# Patient Record
Sex: MALE | Race: BLACK OR AFRICAN AMERICAN | NOT HISPANIC OR LATINO | URBAN - METROPOLITAN AREA
[De-identification: names, ages, dates, MRNs, and addresses within clinical notes are randomized per-mention and may not be internally consistent; named-entity substitution may affect disease eponyms.]

---

## 2019-11-15 ENCOUNTER — EMERGENCY (EMERGENCY)
Facility: HOSPITAL | Age: 44
LOS: 1 days | Discharge: HOME | End: 2019-11-15
Attending: EMERGENCY MEDICINE | Admitting: EMERGENCY MEDICINE
Payer: COMMERCIAL

## 2019-11-15 VITALS
OXYGEN SATURATION: 97 % | TEMPERATURE: 98 F | DIASTOLIC BLOOD PRESSURE: 84 MMHG | HEART RATE: 95 BPM | SYSTOLIC BLOOD PRESSURE: 120 MMHG | RESPIRATION RATE: 16 BRPM

## 2019-11-15 PROCEDURE — 99282 EMERGENCY DEPT VISIT SF MDM: CPT

## 2019-11-15 NOTE — ED PROVIDER NOTE - PATIENT PORTAL LINK FT
You can access the FollowMyHealth Patient Portal offered by [facility] by registering at the following website: [email or web address]. By joining [employer]’s FollowMyHealth portal, you will also be able to view your health information using other applications (apps) compatible with our system.

## 2019-11-15 NOTE — ED PROVIDER NOTE - CLINICAL SUMMARY MEDICAL DECISION MAKING FREE TEXT BOX
43M taking penile injections past 2 weeks concerned about skin discoloration. No urinary symptoms, no rash or fevers. Vitals reassuring. Exam shows no signs of infection. Reassurance with Urologist follow up.

## 2019-11-15 NOTE — ED PROVIDER NOTE - ATTENDING CONTRIBUTION TO CARE
[place]: 43 yom with hx of ED complaining darkening skin at distal penis after he started using injections at base of the penis twice a week. No other sxs. Has appt with urology this week. Exam chaperoned by Dr. Campbell, circumcised no tenderness or masses or fluctuance. there is areas of more melanin but no ecchymosis. Outpt follow up.
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
## 2019-11-15 NOTE — ED PROVIDER NOTE - NSFOLLOWUPINSTRUCTIONS_ED_ALL_ED_FT
Your diagnosis: Penile change in color     Discharge instructions:    - Please follow up with your Urologist on Monday.     - Be sure to return to the ED if you develop new or worsening symptoms. Specific signs and symptoms to be vigilant of: fever or chills, chest pain, difficulty breathing, palpitations, loss of consciousness, headache, vision changes, slurred speech, difficulty swallowing or drooling, facial droop, weakness in the arms or legs, numbness or tingling, abdominal pain, nausea or vomiting, diarrhea, constipation, blood in the stool or urine, pain on urination, difficulty urinating or any other distressing symptoms.

## 2019-11-15 NOTE — ED PROVIDER NOTE - PHYSICAL EXAMINATION
GENERAL: no acute distress, non-toxic appearing  HEENT: normal conjunctiva, oral mucosa moist  CARDIAC: regular rate and rhythm, normal S1 and S2,  no appreciable murmurs  PULM: clear to ascultation bilaterally, no crackles, rales, rhonchi, or wheezing  GI: abdomen nondistended, soft, nontender, no guarding or rebound tenderness  : no CVA tenderness, no suprapubic tenderness  NEURO: alert and oriented x 3, normal speech, no focal motor or sensory deficits, nonantalgic gait  MSK: no visible deformities, no peripheral edema, calf tenderness/redness/swelling  SKIN: no visible rashes, non-erythematous genitalia, non-tender, no fluctuance to any area around genitalia, no crepitus, difficult to appreciate the discoloration patient is concerned about  PSYCH: appropriate mood and affect

## 2019-11-15 NOTE — ED PROVIDER NOTE - NS ED ROS FT
GENERAL: no fever, chills  HEENT: no cough, congestion, odynophagia, dysphagia  CARDIAC: no chest pain, palpitations, lightheadedness  PULM: no dyspnea, wheezing   GI: no abdominal pain, nausea, vomiting, diarrhea, constipation, melena, hematochezia  : no urinary dysuria, frequency, incontinence, hematuria  NEURO: no headache, motor weakness, sensory changes  MSK: no joint or muscle pain  SKIN: +skin discoloration of penis  HEME: no active bleeding, bruising

## 2019-11-15 NOTE — ED ADULT TRIAGE NOTE - CHIEF COMPLAINT QUOTE
Pt states that he has been taking penile injections x 2 weeks. Pt now noticing "disfigurement". Denies any signs of infection.

## 2020-02-09 ENCOUNTER — EMERGENCY (EMERGENCY)
Facility: HOSPITAL | Age: 45
LOS: 1 days | Discharge: HOME | End: 2020-02-09
Attending: STUDENT IN AN ORGANIZED HEALTH CARE EDUCATION/TRAINING PROGRAM | Admitting: STUDENT IN AN ORGANIZED HEALTH CARE EDUCATION/TRAINING PROGRAM
Payer: OTHER MISCELLANEOUS

## 2020-02-09 VITALS
SYSTOLIC BLOOD PRESSURE: 124 MMHG | HEART RATE: 105 BPM | TEMPERATURE: 98 F | RESPIRATION RATE: 18 BRPM | DIASTOLIC BLOOD PRESSURE: 73 MMHG | OXYGEN SATURATION: 97 %

## 2020-02-09 VITALS
RESPIRATION RATE: 15 BRPM | SYSTOLIC BLOOD PRESSURE: 105 MMHG | HEART RATE: 81 BPM | DIASTOLIC BLOOD PRESSURE: 65 MMHG | OXYGEN SATURATION: 96 % | TEMPERATURE: 98 F

## 2020-02-09 PROCEDURE — 73080 X-RAY EXAM OF ELBOW: CPT | Mod: 26,RT

## 2020-02-09 PROCEDURE — 99283 EMERGENCY DEPT VISIT LOW MDM: CPT

## 2020-02-09 PROCEDURE — 99053 MED SERV 10PM-8AM 24 HR FAC: CPT

## 2020-02-09 PROCEDURE — 73030 X-RAY EXAM OF SHOULDER: CPT | Mod: 26,LT

## 2020-02-09 PROCEDURE — 71045 X-RAY EXAM CHEST 1 VIEW: CPT | Mod: 26

## 2020-02-09 RX ORDER — LIDOCAINE 4 G/100G
1 CREAM TOPICAL ONCE
Refills: 0 | Status: COMPLETED | OUTPATIENT
Start: 2020-02-09 | End: 2020-02-09

## 2020-02-09 RX ORDER — IBUPROFEN 200 MG
600 TABLET ORAL ONCE
Refills: 0 | Status: COMPLETED | OUTPATIENT
Start: 2020-02-09 | End: 2020-02-09

## 2020-02-09 RX ORDER — OXYCODONE HYDROCHLORIDE 5 MG/1
5 TABLET ORAL ONCE
Refills: 0 | Status: DISCONTINUED | OUTPATIENT
Start: 2020-02-09 | End: 2020-02-09

## 2020-02-09 RX ORDER — DIAZEPAM 5 MG
5 TABLET ORAL ONCE
Refills: 0 | Status: DISCONTINUED | OUTPATIENT
Start: 2020-02-09 | End: 2020-02-09

## 2020-02-09 RX ORDER — ACETAMINOPHEN 500 MG
975 TABLET ORAL ONCE
Refills: 0 | Status: COMPLETED | OUTPATIENT
Start: 2020-02-09 | End: 2020-02-09

## 2020-02-09 RX ADMIN — Medication 600 MILLIGRAM(S): at 07:44

## 2020-02-09 RX ADMIN — Medication 5 MILLIGRAM(S): at 06:34

## 2020-02-09 RX ADMIN — OXYCODONE HYDROCHLORIDE 5 MILLIGRAM(S): 5 TABLET ORAL at 06:34

## 2020-02-09 RX ADMIN — OXYCODONE HYDROCHLORIDE 5 MILLIGRAM(S): 5 TABLET ORAL at 07:44

## 2020-02-09 RX ADMIN — Medication 600 MILLIGRAM(S): at 06:35

## 2020-02-09 RX ADMIN — LIDOCAINE 1 PATCH: 4 CREAM TOPICAL at 08:23

## 2020-02-09 RX ADMIN — Medication 975 MILLIGRAM(S): at 08:23

## 2020-02-09 NOTE — ED PROVIDER NOTE - PATIENT PORTAL LINK FT
You can access the FollowMyHealth Patient Portal offered by [facility] by registering at the following website: [email or web address]. By joining [employer]’s FollowMyHealth portal, you will also be able to view your health information using other applications (apps) compatible with our system.

## 2020-02-09 NOTE — ED ADULT NURSE NOTE - OBJECTIVE STATEMENT
Patient is a 44y male, A&Ox4, ambulatory, complaining of falling and injuring his     Bed in lowest locked position. Family member at bedside. Will continue to monitor. Patient is a 44y male, A&Ox4, ambulatory, complaining of falling and injuring his left shoulder and right elbow.   Medication administered as per order. Bed in lowest locked position. Family member at bedside. Will continue to monitor.

## 2020-02-09 NOTE — ED PROVIDER NOTE - PHYSICAL EXAMINATION
GEN: no acute respiratory distress. nontoxic, speaking comfortably in full sentences  HEENT: NCAT. face symmetrical. PERRL EOMI, MMM, oropharynx wnl.  Neck: no JVD, trachea midline, no LAD, no tenderness  CV: RRR. +S1S2, no murmur. 2+ pulses in 4 extremities  Chest: CTA B/l. no wheezing, rales, rhonchi. no retractions.   ABD: +BS, soft, non distended, non tender.   MSK: No clubbing, cyanosis, edema. Right arm: FROM, tenderness over right olecranon, no swelling, sensation intact, R Rad/Ulnar arteries 2+. Left ext: tenderness over the left shoulder, decreased rom of shoulder, other joints with FROM, no gross deformity, sensation intact, 2+  left rad/ulnar arteries. No midline or paraspinal tenderness. no spinal step-offs.  Neuro: AAOX3.  Sensation intact, motor 5/5 throughout.   SKIN: No erythema, lesions or rash

## 2020-02-09 NOTE — ED POST DISCHARGE NOTE - RESULT SUMMARY
Received call from Radiology Elbow Xray : read last night as no FX addendum FX of elbow. Patient contact # [phone] Mailbox full alt # [phone] Msg left with Call back PA # and hrs and CDU PA #.

## 2020-02-09 NOTE — ED ADULT TRIAGE NOTE - CHIEF COMPLAINT QUOTE
s/p fall yesterday while at work. pt says he slipped and fell in the side walk and injured his left shoulder and right elbow. c/o severe pain and swelling..  Pt says he is unable to left arm. positive radial pulse and capillary refill is good. No LOC.  Denies any PMH.

## 2020-02-09 NOTE — ED PROVIDER NOTE - NS ED ROS FT
Constitutional: No fever. no chills.   Eyes: No visual changes, eye pain or redness  HEENT: No throat pain, hearing changes, ear pain  CV: No chest pain, no palpitations  Resp: No shortness of breath, no cough  GI: No abdominal pain. No nausea. no  vomiting.   MSK: + musculoskeletal pain  Skin: No rash, lesions, no bruises  Neuro: No headache. No numbness or tingling. No weakness. No dizziness.  Allergy/Immunology: no allergy to medicine or food.

## 2020-02-09 NOTE — ED PROVIDER NOTE - OBJECTIVE STATEMENT
45 yo M no past medical history presents for 1 day of right elbow and  left shoulder pain. 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 patient denies head trauma, loc, neck pain. denies pain elsewhere. no numbness weakness, snuff box pain. states was inially fine (friday) but pain develpoed more yesterday.

## 2020-02-09 NOTE — ED PROVIDER NOTE - NSFOLLOWUPINSTRUCTIONS_ED_ALL_ED_FT
Please follow up with your primary care doctor.  Schedule follow up with orthopedic doctor if symptoms persist with information provided  Can take tylenol 500mg every 6 hours for pain. Can also take ibuprofen 600mg every6 hours for pain.   Return to the Emergency Department for any new or worsening symptoms

## 2020-02-09 NOTE — ED PROVIDER NOTE - CLINICAL SUMMARY MEDICAL DECISION MAKING FREE TEXT BOX
right elbow and left shoulder pain after fall from standing,. neurovascularly intact. will assess for fracture/dislocation. plan: symptom relief, xr, reassess. xray negative. symptoms improve but still some patient. recommended ortho f/u with cont pain med prn. stable for dc.

## 2021-06-08 ENCOUNTER — EMERGENCY (EMERGENCY)
Facility: HOSPITAL | Age: 46
LOS: 1 days | Discharge: HOME | End: 2021-06-08
Admitting: EMERGENCY MEDICINE
Payer: OTHER MISCELLANEOUS

## 2021-06-08 VITALS
HEART RATE: 82 BPM | DIASTOLIC BLOOD PRESSURE: 93 MMHG | TEMPERATURE: 98 F | SYSTOLIC BLOOD PRESSURE: 136 MMHG | OXYGEN SATURATION: 99 % | RESPIRATION RATE: 16 BRPM

## 2021-06-08 VITALS
TEMPERATURE: 99 F | OXYGEN SATURATION: 96 % | DIASTOLIC BLOOD PRESSURE: 96 MMHG | HEART RATE: 76 BPM | RESPIRATION RATE: 16 BRPM | SYSTOLIC BLOOD PRESSURE: 119 MMHG

## 2021-06-08 PROCEDURE — 99284 EMERGENCY DEPT VISIT MOD MDM: CPT

## 2021-06-08 PROCEDURE — 73562 X-RAY EXAM OF KNEE 3: CPT | Mod: 26,RT

## 2021-06-08 RX ORDER — IBUPROFEN 200 MG
400 TABLET ORAL ONCE
Refills: 0 | Status: COMPLETED | OUTPATIENT
Start: 2021-06-08 | End: 2021-06-08

## 2021-06-08 RX ADMIN — Medication 400 MILLIGRAM(S): at 12:40

## 2021-06-08 NOTE — ED PROVIDER NOTE - NEUROLOGICAL, MLM
Alert and oriented, no focal deficits, no motor or sensory deficits. Alert and oriented, no focal deficits, no motor or sensory deficits. 5/5 strength. No saddle anesthesia.

## 2021-06-08 NOTE — ED PROVIDER NOTE - PATIENT PORTAL LINK FT
You can access the FollowMyHealth Patient Portal offered by [facility] by registering at the following website: [email or web address]. By joining [employer]’s FollowMyHealth portal, you will also be able to view your health information using other applications (apps) compatible with our system.

## 2021-06-08 NOTE — ED PROVIDER NOTE - MUSCULOSKELETAL MINIMAL EXAM
+L cervical paraspinal tenderness. +B/L lumbar paraspinal tenderness. NO midline tenderness. FROM. Atraumatic. +L cervical paraspinal tenderness. +B/L lumbar paraspinal tenderness. NO midline tenderness./atraumatic/normal range of motion
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
## 2023-02-05 NOTE — ED PROVIDER NOTE - INTERNATIONAL TRAVEL
[facility] AMBULATORY ENCOUNTER    URGENT CARE OFFICE VISIT    CHIEF COMPLAINT:    Chief Complaint   Patient presents with   • Ear Problem       SUBJECTIVE:  Leon Llanes is a 23 year old male, who presents with muffled right hearing for the last week and half to 2 weeks.  He tried cleaning his ears but he does not think there is any wax.  Denies being congested.  He does work as a  and says the air inside the cab of his truck his very dry in the winter.      REVIEW OF SYSTEMS:  A review of systems was performed and findings relevant to this complaint are included in the History of Present Illness.    HISTORIES:  ALLERGIES:   Allergen Reactions   • Horse   (Animal) SWELLING     watery eyes etc.--seasonal allergies   • Pollen SWELLING     grasses, trees  (seasonal allergies)         OBJECTIVE:  Visit Vitals  /81   Pulse 75   Temp 98.1 °F (36.7 °C) (Temporal)   Resp 16   Wt 99.8 kg (220 lb)   SpO2 96%   BMI 33.95 kg/m²       CONSTITUTIONAL:  Well appearing. No distress.  Eyes:  Conjunctivae clear.   Ears:  Bilateral TMs have good translucency and reflection.  The right ear does have some bulging from serous appearing fluid.  There is no significant cerumen accumulation in either canal.  The canals are patent without erythema or swelling.    Oral:  Moist mucous membranes.  Oral cavity and pharynx normal in appearance.  Tonsils not appreciably enlarged.  LUNGS:  Easy work of breathing on room air.  No wheezing, crackles or rhonchi noted on auscultation.  CARDIOVASCULAR:  Regular rhythm.  No murmurs.   SKIN:  Warm and dry.         ASSESSMENT/ PLAN:  1. Dysfunction of right eustachian tube      -- Muffled hearing on the right without pain, appears to have clear effusion, likely from eustachian tube dysfunction.  Will start him on Zyrtec and Flonase daily for the next couple weeks.  If no improvement, follow with primary.     No

## 2024-04-15 ENCOUNTER — RESULT REVIEW (OUTPATIENT)
Age: 49
End: 2024-04-15

## 2024-04-15 ENCOUNTER — INPATIENT (INPATIENT)
Facility: HOSPITAL | Age: 49
LOS: 1 days | Discharge: HOME | End: 2024-04-17
Attending: STUDENT IN AN ORGANIZED HEALTH CARE EDUCATION/TRAINING PROGRAM | Admitting: STUDENT IN AN ORGANIZED HEALTH CARE EDUCATION/TRAINING PROGRAM
Payer: COMMERCIAL

## 2024-04-15 VITALS
HEART RATE: 96 BPM | TEMPERATURE: 99 F | RESPIRATION RATE: 18 BRPM | SYSTOLIC BLOOD PRESSURE: 115 MMHG | OXYGEN SATURATION: 96 % | DIASTOLIC BLOOD PRESSURE: 77 MMHG

## 2024-04-15 PROBLEM — Z78.9 OTHER SPECIFIED HEALTH STATUS: Chronic | Status: ACTIVE | Noted: 2021-06-08

## 2024-04-15 LAB
ADD ON TEST-SPECIMEN IN LAB: SIGNIFICANT CHANGE UP
ADD ON TEST-SPECIMEN IN LAB: SIGNIFICANT CHANGE UP
ALBUMIN SERPL ELPH-MCNC: 4.1 G/DL — SIGNIFICANT CHANGE UP (ref 3.3–5)
ALP SERPL-CCNC: 47 U/L — SIGNIFICANT CHANGE UP (ref 40–120)
ALT FLD-CCNC: 42 U/L — HIGH (ref 4–41)
ANION GAP SERPL CALC-SCNC: 12 MMOL/L — SIGNIFICANT CHANGE UP (ref 7–14)
AST SERPL-CCNC: 46 U/L — HIGH (ref 4–40)
BASOPHILS # BLD AUTO: 0.03 K/UL — SIGNIFICANT CHANGE UP (ref 0–0.2)
BASOPHILS NFR BLD AUTO: 0.7 % — SIGNIFICANT CHANGE UP (ref 0–2)
BILIRUB SERPL-MCNC: 0.6 MG/DL — SIGNIFICANT CHANGE UP (ref 0.2–1.2)
BUN SERPL-MCNC: 9 MG/DL — SIGNIFICANT CHANGE UP (ref 7–23)
CALCIUM SERPL-MCNC: 9 MG/DL — SIGNIFICANT CHANGE UP (ref 8.4–10.5)
CHLORIDE SERPL-SCNC: 99 MMOL/L — SIGNIFICANT CHANGE UP (ref 98–107)
CO2 SERPL-SCNC: 26 MMOL/L — SIGNIFICANT CHANGE UP (ref 22–31)
CREAT SERPL-MCNC: 0.99 MG/DL — SIGNIFICANT CHANGE UP (ref 0.5–1.3)
EGFR: 95 ML/MIN/1.73M2 — SIGNIFICANT CHANGE UP
EOSINOPHIL # BLD AUTO: 0.07 K/UL — SIGNIFICANT CHANGE UP (ref 0–0.5)
EOSINOPHIL NFR BLD AUTO: 1.6 % — SIGNIFICANT CHANGE UP (ref 0–6)
FLUAV AG NPH QL: SIGNIFICANT CHANGE UP
FLUBV AG NPH QL: SIGNIFICANT CHANGE UP
GLUCOSE SERPL-MCNC: 88 MG/DL — SIGNIFICANT CHANGE UP (ref 70–99)
HCT VFR BLD CALC: 50.3 % — HIGH (ref 39–50)
HGB BLD-MCNC: 16.3 G/DL — SIGNIFICANT CHANGE UP (ref 13–17)
IANC: 2.17 K/UL — SIGNIFICANT CHANGE UP (ref 1.8–7.4)
IMM GRANULOCYTES NFR BLD AUTO: 0.2 % — SIGNIFICANT CHANGE UP (ref 0–0.9)
LYMPHOCYTES # BLD AUTO: 1.54 K/UL — SIGNIFICANT CHANGE UP (ref 1–3.3)
LYMPHOCYTES # BLD AUTO: 36 % — SIGNIFICANT CHANGE UP (ref 13–44)
MCHC RBC-ENTMCNC: 27.8 PG — SIGNIFICANT CHANGE UP (ref 27–34)
MCHC RBC-ENTMCNC: 32.4 GM/DL — SIGNIFICANT CHANGE UP (ref 32–36)
MCV RBC AUTO: 85.8 FL — SIGNIFICANT CHANGE UP (ref 80–100)
MONOCYTES # BLD AUTO: 0.46 K/UL — SIGNIFICANT CHANGE UP (ref 0–0.9)
MONOCYTES NFR BLD AUTO: 10.7 % — SIGNIFICANT CHANGE UP (ref 2–14)
NEUTROPHILS # BLD AUTO: 2.17 K/UL — SIGNIFICANT CHANGE UP (ref 1.8–7.4)
NEUTROPHILS NFR BLD AUTO: 50.8 % — SIGNIFICANT CHANGE UP (ref 43–77)
NRBC # BLD: 0 /100 WBCS — SIGNIFICANT CHANGE UP (ref 0–0)
NRBC # FLD: 0 K/UL — SIGNIFICANT CHANGE UP (ref 0–0)
NT-PROBNP SERPL-SCNC: 204 PG/ML — SIGNIFICANT CHANGE UP
PLATELET # BLD AUTO: 206 K/UL — SIGNIFICANT CHANGE UP (ref 150–400)
POTASSIUM SERPL-MCNC: 4.5 MMOL/L — SIGNIFICANT CHANGE UP (ref 3.5–5.3)
POTASSIUM SERPL-SCNC: 4.5 MMOL/L — SIGNIFICANT CHANGE UP (ref 3.5–5.3)
PROT SERPL-MCNC: 7.3 G/DL — SIGNIFICANT CHANGE UP (ref 6–8.3)
RBC # BLD: 5.86 M/UL — HIGH (ref 4.2–5.8)
RBC # FLD: 14.7 % — HIGH (ref 10.3–14.5)
RSV RNA NPH QL NAA+NON-PROBE: SIGNIFICANT CHANGE UP
SARS-COV-2 RNA SPEC QL NAA+PROBE: SIGNIFICANT CHANGE UP
SODIUM SERPL-SCNC: 137 MMOL/L — SIGNIFICANT CHANGE UP (ref 135–145)
TROPONIN T, HIGH SENSITIVITY RESULT: 16 NG/L — SIGNIFICANT CHANGE UP
TROPONIN T, HIGH SENSITIVITY RESULT: 17 NG/L — SIGNIFICANT CHANGE UP
WBC # BLD: 4.28 K/UL — SIGNIFICANT CHANGE UP (ref 3.8–10.5)
WBC # FLD AUTO: 4.28 K/UL — SIGNIFICANT CHANGE UP (ref 3.8–10.5)

## 2024-04-15 PROCEDURE — 71046 X-RAY EXAM CHEST 2 VIEWS: CPT | Mod: 26

## 2024-04-15 PROCEDURE — 99223 1ST HOSP IP/OBS HIGH 75: CPT

## 2024-04-15 NOTE — ED ADULT NURSE REASSESSMENT NOTE - NS ED NURSE REASSESS COMMENT FT1
report received from antonio Bonilla. LAKESHA&Ox4. NAD. ambulatory. PT states he quit smoking cigarettes one year ago and has had a dry cough that has been in creasing in frequency and severity. PT states after a coughing fit yesterday he felt like he couldn't "breath" and when EMS showed up he was feeling better. however he woke up from sleeping last night with the same symptoms of not being able to breath so he decided to come to the hospital. pt denies SOB, chest pain, dizziness, weakness, urinary symptoms, HA, n/v/d, fevers, chills, pain. respirations ar even and un labored. safety precautions maintained.
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
## 2024-04-15 NOTE — ED ADULT TRIAGE NOTE - CHIEF COMPLAINT QUOTE
c/o an episode fo SOB yesterday while coughing could not catch breath at that time., reports breathing improved by not back to baseline. Denies PHx, endorses recent  long distance car drive after which developed left calf pain.
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
## 2024-04-15 NOTE — CONSULT NOTE ADULT - NS ATTEND AMEND GEN_ALL_CORE FT
Plan for cath today  Needs outpatient f/u with HF as well as STACY evaluation  start GDMT after cath results.

## 2024-04-15 NOTE — ED ADULT NURSE NOTE - NSFALLUNIVINTERV_ED_ALL_ED
Bed/Stretcher in lowest position, wheels locked, appropriate side rails in place/Call bell, personal items and telephone in reach/Instruct patient to call for assistance before getting out of bed/chair/stretcher/Non-slip footwear applied when patient is off stretcher/[place] to call system/Physically safe environment - no spills, clutter or unnecessary equipment/Purposeful proactive rounding/Room/bathroom lighting operational, light cord in reach
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
## 2024-04-15 NOTE — ED CDU PROVIDER INITIAL DAY NOTE - ATTENDING APP SHARED VISIT CONTRIBUTION OF CARE
CDU MD LEVINE:  I performed a face to face bedside interview with patient regarding history of present illness, review of symptoms and past medical history. I completed an independent physical exam.  I have discussed patient's plan of care with PA.   I agree with note as stated above, having amended the EMR as needed to reflect my findings. I have discussed the assessment and plan of care.  This includes during the time I functioned as the attending physician for this patient.    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  CDU for cardiac w/u.

## 2024-04-15 NOTE — ED PROVIDER NOTE - CLINICAL SUMMARY MEDICAL DECISION MAKING FREE TEXT BOX
47 yo M no PMHx presents with SOB x 1 year exertional, ex smoker, occupational exposures - VSS, nonfocal exam - ddx includes but is not limited to for ACS, HF vs COPD (less likely, no wheezing currently), viral pathology/pna, pulmonary fibrosis/malignancy given exposures. Will obtain labs, CXR, offer CDU for echo/stress vs outpatient followup as well as pulmonary follow up.

## 2024-04-16 DIAGNOSIS — R06.02 SHORTNESS OF BREATH: ICD-10-CM

## 2024-04-16 DIAGNOSIS — I50.20 UNSPECIFIED SYSTOLIC (CONGESTIVE) HEART FAILURE: ICD-10-CM

## 2024-04-16 DIAGNOSIS — Z29.9 ENCOUNTER FOR PROPHYLACTIC MEASURES, UNSPECIFIED: ICD-10-CM

## 2024-04-16 DIAGNOSIS — R74.8 ABNORMAL LEVELS OF OTHER SERUM ENZYMES: ICD-10-CM

## 2024-04-16 LAB
ALBUMIN SERPL ELPH-MCNC: 4.1 G/DL — SIGNIFICANT CHANGE UP (ref 3.3–5)
ALP SERPL-CCNC: 46 U/L — SIGNIFICANT CHANGE UP (ref 40–120)
ALT FLD-CCNC: 40 U/L — SIGNIFICANT CHANGE UP (ref 4–41)
ANION GAP SERPL CALC-SCNC: 12 MMOL/L — SIGNIFICANT CHANGE UP (ref 7–14)
APTT BLD: 31.5 SEC — SIGNIFICANT CHANGE UP (ref 24.5–35.6)
AST SERPL-CCNC: 37 U/L — SIGNIFICANT CHANGE UP (ref 4–40)
BILIRUB SERPL-MCNC: 0.6 MG/DL — SIGNIFICANT CHANGE UP (ref 0.2–1.2)
BUN SERPL-MCNC: 12 MG/DL — SIGNIFICANT CHANGE UP (ref 7–23)
CALCIUM SERPL-MCNC: 9.1 MG/DL — SIGNIFICANT CHANGE UP (ref 8.4–10.5)
CHLORIDE SERPL-SCNC: 100 MMOL/L — SIGNIFICANT CHANGE UP (ref 98–107)
CHOLEST SERPL-MCNC: 151 MG/DL — SIGNIFICANT CHANGE UP
CK SERPL-CCNC: 824 U/L — HIGH (ref 30–200)
CO2 SERPL-SCNC: 25 MMOL/L — SIGNIFICANT CHANGE UP (ref 22–31)
CREAT SERPL-MCNC: 0.97 MG/DL — SIGNIFICANT CHANGE UP (ref 0.5–1.3)
EGFR: 97 ML/MIN/1.73M2 — SIGNIFICANT CHANGE UP
GGT SERPL-CCNC: 28 U/L — SIGNIFICANT CHANGE UP (ref 8–61)
GLUCOSE SERPL-MCNC: 89 MG/DL — SIGNIFICANT CHANGE UP (ref 70–99)
HDLC SERPL-MCNC: 34 MG/DL — LOW
INR BLD: 1.14 RATIO — SIGNIFICANT CHANGE UP (ref 0.85–1.18)
LACTATE BLDV-MCNC: 1.8 MMOL/L — SIGNIFICANT CHANGE UP (ref 0.5–2)
LIPID PNL WITH DIRECT LDL SERPL: 103 MG/DL — HIGH
MAGNESIUM SERPL-MCNC: 1.8 MG/DL — SIGNIFICANT CHANGE UP (ref 1.6–2.6)
NON HDL CHOLESTEROL: 117 MG/DL — SIGNIFICANT CHANGE UP
PHOSPHATE SERPL-MCNC: 3.3 MG/DL — SIGNIFICANT CHANGE UP (ref 2.5–4.5)
POTASSIUM SERPL-MCNC: 4.5 MMOL/L — SIGNIFICANT CHANGE UP (ref 3.5–5.3)
POTASSIUM SERPL-SCNC: 4.5 MMOL/L — SIGNIFICANT CHANGE UP (ref 3.5–5.3)
PROT SERPL-MCNC: 7.5 G/DL — SIGNIFICANT CHANGE UP (ref 6–8.3)
PROTHROM AB SERPL-ACNC: 12.8 SEC — SIGNIFICANT CHANGE UP (ref 9.5–13)
SODIUM SERPL-SCNC: 137 MMOL/L — SIGNIFICANT CHANGE UP (ref 135–145)
TRIGL SERPL-MCNC: 70 MG/DL — SIGNIFICANT CHANGE UP
TSH SERPL-MCNC: 2.08 UIU/ML — SIGNIFICANT CHANGE UP (ref 0.27–4.2)

## 2024-04-16 PROCEDURE — 99223 1ST HOSP IP/OBS HIGH 75: CPT

## 2024-04-16 PROCEDURE — 76705 ECHO EXAM OF ABDOMEN: CPT | Mod: 26

## 2024-04-16 PROCEDURE — 99223 1ST HOSP IP/OBS HIGH 75: CPT | Mod: GC

## 2024-04-16 PROCEDURE — 93458 L HRT ARTERY/VENTRICLE ANGIO: CPT | Mod: 26

## 2024-04-16 PROCEDURE — 99233 SBSQ HOSP IP/OBS HIGH 50: CPT

## 2024-04-16 RX ORDER — INFLUENZA VIRUS VACCINE 15; 15; 15; 15 UG/.5ML; UG/.5ML; UG/.5ML; UG/.5ML
0.5 SUSPENSION INTRAMUSCULAR ONCE
Refills: 0 | Status: COMPLETED | OUTPATIENT
Start: 2024-04-16 | End: 2024-04-16

## 2024-04-16 RX ORDER — LANOLIN ALCOHOL/MO/W.PET/CERES
3 CREAM (GRAM) TOPICAL AT BEDTIME
Refills: 0 | Status: DISCONTINUED | OUTPATIENT
Start: 2024-04-16 | End: 2024-04-17

## 2024-04-16 RX ORDER — ACETAMINOPHEN 500 MG
650 TABLET ORAL ONCE
Refills: 0 | Status: COMPLETED | OUTPATIENT
Start: 2024-04-16 | End: 2024-04-16

## 2024-04-16 RX ADMIN — Medication 650 MILLIGRAM(S): at 22:38

## 2024-04-16 RX ADMIN — Medication 650 MILLIGRAM(S): at 23:38

## 2024-04-16 NOTE — H&P ADULT - ASSESSMENT
Patient is a 48 year old male with no past medical history who presents to the ED after an episode of shortness of breath, found to have HFrEF, admitted for further workup.

## 2024-04-16 NOTE — CHART NOTE - NSCHARTNOTEFT_GEN_A_CORE
[facility] Medicine Night Coverage     Patient is s/p cardiac cath via right radial access. Patient without any complaints. Site is stable with no hematoma or active bleed noted. No swelling, dressing is clean/intact/dry. Right Radial pulse palpable.  strength is equal bilaterally. Patient has full ROM in right wrist. Capillary refill < 2 seconds. Denies CP, SOB, numbness/tingling along RUE. Will continue to monitor closely.     Nahomi Seay PA-C  Department of Medicine   [facility]  [id number]

## 2024-04-16 NOTE — CONSULT NOTE ADULT - SUBJECTIVE AND OBJECTIVE BOX
PATIENT:  FLORIDA PETERS  [id number]    CHIEF COMPLAINT:  Patient is a 48y old  Male who presents with a chief complaint of EF 15-20% (16 Apr 2024 13:19)      INTERVAL HISTORY/OVERNIGHT EVENTS:  Patient is a 48 year old male with no past medical history who presents to the ED after an episode of shortness of breath. States that 2 days ago he was not able to stop coughing and became extremely short of breath for about 50 seconds. This episode was not brought about with exertion, and the last time this occurred was about 1 year ago or so. States that he quit smoking a year ago ( after smoking 1 PPD x 5 years) and since then has been having worsening cough, sometimes with sputum. States that he has been getting more short of breath the past year as well, and now is having trouble going up one flight of stairs and feels winded by the time he gets up there. He states since quitting smoking, he has been working to lose weight and has lost about 30lbs in the past 6 months ( has been eating healthier and working out). He denies any swelling in his lower extremities, any PND, but does endorse episodes of choking in the middle of the night that is witnessed by his wife. States that it has happened a few times the past year.  Does not go to any doctors, takes no medications or OTC pills.    In the ED, VSS, CXR shows cardiomegaly and pulmonary vascular congestion, and TTE shows EF 15-20%, admitted for further workup.     MEDICATIONS:  MEDICATIONS  (STANDING):    MEDICATIONS  (PRN):  melatonin 3 milliGRAM(s) Oral at bedtime PRN Insomnia      ALLERGIES:  Allergies    No Known Allergies    Intolerances        OBJECTIVE:  ICU Vital Signs Last 24 Hrs  T(C): 36.7 (16 Apr 2024 14:20), Max: 36.7 (16 Apr 2024 01:26)  T(F): 98 (16 Apr 2024 14:20), Max: 98 (16 Apr 2024 01:26)  HR: 97 (16 Apr 2024 14:20) (75 - 97)  BP: 111/82 (16 Apr 2024 14:20) (103/72 - 129/85)  RR: 18 (16 Apr 2024 14:20) (17 - 18)  SpO2: 95% (16 Apr 2024 14:20) (94% - 96%)    O2 Parameters below as of 16 Apr 2024 14:20  Patient On (Oxygen Delivery Method): room air    PHYSICAL EXAMINATION:  General: WN/WD NAD  HEENT: EOMI, moist mucous membranes  Neurology: A&Ox3, nonfocal, WINTERS x 4  Respiratory: CTA B/L, normal respiratory effort, no wheezes, crackles, rales  CV: RRR, S1S2, no murmurs, rubs or gallops  Abdominal: Soft, NT, ND +BS  Extremities: No edema, + peripheral pulses    LABS:                          16.3   4.28  )-----------( 206      ( 15 Apr 2024 10:00 )             50.3     04-16    137  |  100  |  12  ----------------------------<  89  4.5   |  25  |  0.97    Ca    9.1      16 Apr 2024 13:45  Phos  3.3     04-16  Mg     1.80     04-16    TPro  7.5  /  Alb  4.1  /  TBili  0.6  /  DBili  x   /  AST  37  /  ALT  40  /  AlkPhos  46  04-16    LIVER FUNCTIONS - ( 16 Apr 2024 13:45 )  Alb: 4.1 g/dL / Pro: 7.5 g/dL / ALK PHOS: 46 U/L / ALT: 40 U/L / AST: 37 U/L / GGT: 28 U/L       PT/INR - ( 16 Apr 2024 13:45 )   PT: 12.8 sec;   INR: 1.14 ratio         PTT - ( 16 Apr 2024 13:45 )  PTT:31.5 sec  Creatine Kinase, Serum: 824 U/L (04-16 @ 13:45)    CARDIAC MARKERS ( 16 Apr 2024 13:45 )  x     / x     / 824 U/L / x     / x      CARDIAC MARKERS ( 15 Apr 2024 11:40 )  x     / x     / 1098 U/L / x     / 4.0 ng/mL      Urinalysis Basic - ( 16 Apr 2024 13:45 )    Color: x / Appearance: x / SG: x / pH: x  Gluc: 89 mg/dL / Ketone: x  / Bili: x / Urobili: x   Blood: x / Protein: x / Nitrite: x   Leuk Esterase: x / RBC: x / WBC x   Sq Epi: x / Non Sq Epi: x / Bacteria: x        TELEMETRY:     EKG:     IMAGING:      
HPI:  48-year-old male, former smoker for 5 years 1 PPD, stopped smoking 1 year ago. Pt. denies any past medical history and is presenting to the ED with shortness of breath which has been ongoing for the last year. Today he said that he could not stop coughing and then became extremely short of breath. Pt. states that he his shortness of breath occurs on exertion and he is unable to tolerate walking one block or a flight of stairs without becoming severly short of breath. He is able to lay flat and sleeps on will pillow while flat at night. Patient is a former  and worked at [employer] on the ground. He denies any chest pain, shortness of breath, palpitations, increased fluid retention, syncope, headache, dizziness, lightheadedness, nausea, abdominal pain, fever or chills.      EKG shows NSR HR 99    Cardiac Enzymes: Troponin 17<---16, CKMB 4.0, CK 1098, pBNP 204    /85, HR 75, O2 94% on Room Air    	     Allergies  No Known Allergies  Intolerances    MEDICATIONS:  PAST MEDICAL & SURGICAL HISTORY:  No pertinent past medical history  No significant past surgical history    FAMILY HISTORY:      SUBSTANCE USE  Tobacco Usage:  denies  Alcohol Usage: denies  Recreational drugs: denies      REVIEW OF SYSTEMS:  CV: chest pain (-), palpitation (-), orthopnea (-), PND (-), edema (-)  PULM: SOB (-), cough (-), wheezing (-), hemoptysis (-).   CONST: fever (-), chills (-) or fatigue (-)  GI: abdominal distension (-), abdominal pain (-) , nausea/vomiting (-), hematemesis, (-), melena (-), hematochezia (-)  : dysuria (-), frequency (-), hematuria (-).   NEURO: numbness (-), weakness (-), dizziness (-)  SKIN: itching (-), rash (-)  HEENT:  visual changes (-); vertigo or throat pain (-);  neck stiffness (-)   All other review of systems is negative unless indicated above.      T(C): 36.6 (04-15-24 @ 21:59), Max: 37 (04-15-24 @ 09:18)  HR: 75 (04-15-24 @ 21:59) (75 - 96)  BP: 129/85 (04-15-24 @ 21:59) (114/75 - 129/85)  RR: 17 (04-15-24 @ 21:59) (17 - 20)  SpO2: 94% (04-15-24 @ 21:59) (94% - 97%)  Wt(kg): --  I&O's Summary      Physical Exam:  General: NAD  Cardiovascular: Normal S1 S2, No JVD, No murmurs, No edema  Respiratory: Lungs clear to auscultation	  Gastrointestinal:  Soft, Non-tender, + BS	  Skin: warm and dry, No rashes, No ecchymoses, No cyanosis	  Extremities:  No clubbing, cyanosis or edema  Vascular: Peripheral pulses palpable 2+ bilaterally    CBC Full  -  ( 15 Apr 2024 10:00 )  WBC Count : 4.28 K/uL  Hemoglobin : 16.3 g/dL  Hematocrit : 50.3 %  Platelet Count - Automated : 206 K/uL  Mean Cell Volume : 85.8 fL  Mean Cell Hemoglobin : 27.8 pg  Mean Cell Hemoglobin Concentration : 32.4 gm/dL  Auto Neutrophil # : 2.17 K/uL  Auto Lymphocyte # : 1.54 K/uL  Auto Monocyte # : 0.46 K/uL  Auto Eosinophil # : 0.07 K/uL  Auto Basophil # : 0.03 K/uL  Auto Neutrophil % : 50.8 %  Auto Lymphocyte % : 36.0 %  Auto Monocyte % : 10.7 %  Auto Eosinophil % : 1.6 %  Auto Basophil % : 0.7 %    04-15    137  |  99  |  9   ----------------------------<  88  4.5   |  26  |  0.99    Ca    9.0      15 Apr 2024 10:00    TPro  7.3  /  Alb  4.1  /  TBili  0.6  /  DBili  x   /  AST  46<H>  /  ALT  42<H>  /  AlkPhos  47  04-15    proBNP:   Lipid Profile:   HgA1c:   TSH:   Cardiac Enzymes:       Diagnostic testing:  CXR:  < from: Xray Chest 2 Views PA/Lat (04.15.24 @ 11:50) >    INTERPRETATION:  Clinical history: 48-year-old male, shortness of breath.    Two views of the chest are compared to 2/9/2020.    FINDINGS: Mild cardiomegaly and mild, central pulmonary venous congestion   with no consolidation or effusion.    No acute osseous findings, mild midthoracic dextroscoliosis, unchanged.    IMPRESSION:    Mild cardiomegaly and mild, central pulmonary venous congestion, new    --- End of Report ---      < end of copied text >      echo:  < from: TTE W or WO Ultrasound Enhancing Agent (04.15.24 @ 17:19) >  Referring Physician:    ED CDU  Interpreting Physician: Manoj Mcmullen M.D.  Primary Sonographer:    Denisha Saldana [facility]    CPT:                ECHO TTE WITH CON COMP W DOPP - .m;DEFINITY ECHO                      CONTRAST PER ML - .m  Indication(s):      Chronic ischemic heart disease, unspecified - I25.9  Procedure:          Transthoracic echocardiogram with 2-D, M-mode and complete                      spectral and color flow Doppler.  Ordering Location:  [facility]  Admission Status:   ED  Contrast Injection: Verbal consent was obtained for injection of Ultrasonic                      Enhancing Agent following a discussion of risks and                      benefits.                      Endocardial visualization enhanced with 2 ml of Definity                      Ultrasound enhancing agent (Lot#:[id number] Exp.Date:03/25                      Discarded Dose:8ml).  UEA Reaction:       Patient had no adverse reaction after injection of                      Ultrasound Enhancing Agent.  Study Information:  Image quality for this study is good.    _______________________________________________________________________________________     CONCLUSIONS:      1. The left ventricular cavity is severely dilated. Left ventricular wall thickness is normal. Left ventricular systolic function is severely decreased with an ejection fraction visually estimated at 15-20%. There is global left ventricular hypokinesis. There is mild (grade 1) left ventricular diastolic dysfunction. There is increased LV mass and eccentric hypertrophy. There is no evidence of a left ventricular thrombus.   2. Normal right ventricular cavity size and normal systolic function.   3. Structurally normal mitral valve with normal leaflet excursion. There is trace mitral regurgitation.   4. The left atrium is mildly dilated with an indexed volume of 34.48 ml/m².   5. No prior echocardiogram is available for comparison.    ________________________________________________________________________________________  FINDINGS:     Left Ventricle:  The left ventricular cavity is severely dilated. Left ventricular wall thickness is normal. Left ventricular systolic function is severely decreased with an ejection fraction visually estimated at 15-20%. There is global left ventricular hypokinesis. There is mild (grade 1) left ventricular diastolicdysfunction. There is increased LV mass and eccentric hypertrophy. There is no evidence of a left ventricular thrombus.     Right Ventricle:  The right ventricular cavity is normal in size and normal systolic function. Tricuspid annular plane systolic excursion (TAPSE) is 3.3 cm (normal >=1.7 cm).     Left Atrium:  The left atrium is mildly dilated with an indexed volume of 34.48 ml/m².     Right Atrium:  The right atrium is normal in size with an indexed volume of 21.95 ml/m² and an indexed areaof 7.56 cm²/m².     Aortic Valve:  The aortic valve anatomy cannot be determined. There is no evidence of aortic regurgitation.     Mitral Valve:  Structurally normal mitral valve with normal leaflet excursion. There is trace mitral regurgitation.     Tricuspid Valve:  Structurally normal tricuspid valve with normal leaflet excursion. There is trace tricuspid regurgitation.     Pulmonic Valve:  Structurally normal pulmonic valve with normal leaflet excursion. There is trace pulmonic regurgitation.     Aorta:  The aortic root at the sinuses of Valsalva is normal in size, measuring 4.10 cm (indexed 1.77 cm/m²). The ascending aorta diameter is normal in size, measuring 3.80 cm (indexed 1.64 cm/m²).     Pericardium:  No pericardial effusion seen.     Systemic Veins:  The inferior vena cava is normal in size measuring 1.70 cm in diameter, (normal <2.1cm) with abnormal inspiratory collapse (abnormal <50%) consistent with mildly elevated right atrial pressure (~8, range 5-10mmHg).    < end of copied text >

## 2024-04-16 NOTE — PATIENT PROFILE ADULT - ARE SIGNIFICANT INDICATORS COMPLETE.
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 Oropharyngeal dysphagia R13.12 787.22   2. Dysarthria R47.1 784.51           SLP OP Goals       17 1000       Subjective Comments Juli is highly motivated to participate in therapy. She reports no changes or concerns from last session.   -HS       Able to rate subjective pain? yes  -HS    Pre-Treatment Pain Level 0  -HS    Post-Treatment Pain Level 0  -HS       Patient will apply compensatory strategies to improve intelligibility of speech during in spontaneous speech 90%:;without cues  -HS    Status: Patient will apply compensatory strategies to improve intelligibility of speech during in spontaneous speech --   Did not target this session.  -HS    Comments: Patient will apply compensatory strategies to improve intelligibility of speech during in spontaneous speech        Status: Patient will maintain nutrition/hydration via alternative means     Comments: Patient will maintain nutrition/hydration via alternative means     Pt will tolerate some PO diet w/o aspiration to adequately meet nutrition/hydration needs     Status: Pt will tolerate some PO diet w/o aspiration to adequately meet nutrition/hydration needs     Comments: Pt will tolerate some PO diet w/o aspiration to adequately meet nutrition/hydration needs     Status: Patient will increase hydration by tolerating water between meals after oral care     Comments: Patient will increase hydration by tolerating water between meals  after oral care     Patient will improve oral skills to enhance safety and increase eating efficiency by increasing accuracy of A-P tongue movements without cues   100%  -HS    Status: Patient will improve oral skills to enhance safety and increase eating efficiency by increasing accuracy of A-P tongue movements Progressing as expected   Partially Met  -HS    Comments: Patient will improve oral skills to enhance safety and increase eating efficiency by increasing accuracy of A-P tongue movements 100% without cues. Goal criteria met x1 session. Will continue goal for carryover/generalization.  -HS    Patient will increase laryngeal elevation to reduce residue that might fall into airway by completing Mendelsohn maneuver;without cues   10)%  -HS    Status: Patient will increase laryngeal elevation to reduce residue that might fall into airway by completing Progressing as expected  -HS    Comments: Patient will increase laryngeal elevation to reduce residue that might fall into airway by completing 90% without cues.   -HS    Patient will increase strength of tongue base and posterior pharyngeal walls to reduce residue that might fall into airway by completing Liza (tongue hold)   100%  -HS    Status: Patient will increase strength of tongue base and posterior pharyngeal walls to reduce residue that might fall into airway by completing Progressing as expected  -HS    Comments: Patient will increase strength of tongue base and posterior pharyngeal walls to reduce residue that might fall into airway by completing 95% without cues.   -HS    Status: Patient will increase superior/anterior movement of hyolaryngeal complex to reduce residue which may fall into airway by using the Expiratory Muscle Strength Trainer --   Did not target this session  -HS    Comments: Patient will increase superior/anterior movement of hyolaryngeal complex to reduce residue which may fall into airway by using the Expiratory Muscle Strength  Trainer Patient states she has misplaced her EMST device at home. She is currently searching for it and will report back when she finds it.   -HS    Patient will increase tipping of arytenoids and closure at entrance to the airway to reduce penetration into the vestibule by completing super-supraglottic swallow;without cues   100%  -HS    Status: Patient will increase tipping of arytenoids and closure at entrance to the airway to reduce penetration into the vestibule by completing Progressing as expected  -HS    Comments: Patient will increase tipping of arytenoids and closure at entrance to the airway to reduce penetration into the vestibule by completing 95% without cues.   -HS    Patient will compensate for oral/pharyngeal deficits and reduce risks while eating by utilizing  compensatory strategies no straw;multiple swallows;without cues  -HS    Status: Patient will compensate for oral/pharyngeal deficits and reduce risks while eating by utilizing  compensatory strategies --   Did not target this session.  -HS    Comments: Patient will compensate for oral/pharyngeal deficits and reduce risks while eating by utilizing  compensatory strategies No PO trials given during today's therapy session. Patient reports no further instances of temperature spikes following PO intake at home. She is to continue to log her PO intake/symptoms.   -HS       Other Adult Goal- 1 Patient will increase superior/anterior movement of the hyolaryngeal complex to improve swallow safety by triggering swallow at peak of amplitude on 100% of opportunities during neuromuscular electrical stimulation (NMES).  -HS    Status: Other Adult Goal- 1 Progressing as expected  -HS    Comments: Other Adult Goal- 1 Parameters: 50 Hz, 175 phase duration, 12 amps. Patient tolerated well for 30 minutes. Triggered swallow at peak of amplitude with 95% accuracy independently.     -HS      User Key  (r) = Recorded By, (t) = Taken By, (c) = Cosigned By     Initials Name Provider Type    HS Batool KendallgilbertMS cassi CCC-SLP Speech and Language Pathologist              OP SLP Education       01/12/17 1000       Barriers to Learning     Education Provided --   PO intake log  -HS    Assessed     Learning Motivation Strong  -HS    Learning Method Explanation  -HS    Teaching Response Verbalized understanding  -HS      User Key  (r) = Recorded By, (t) = Taken By, (c) = Cosigned By    Initials Name Effective Dates    HS Batool Aidacassi MS CCC-SLP 04/13/15 -               OP SLP Assessment/Plan - 01/12/17 1000     SLP Plan    Plan Comments Continue therapy.   -HS      User Key  (r) = Recorded By, (t) = Taken By, (c) = Cosigned By    Initials Name Provider Type    HS Batool Kendallgilbertcassi MS CCC-SLP Speech and Language Pathologist         Time Calculation:   SLP Start Time: 0915  SLP Stop Time: 1000  SLP Time Calculation (min): 45 min    Therapy Charges for Today     Code Description Service Date Service Provider Modifiers Qty    [id number] HC ST TREATMENT SWALLOW 3 1/12/2017 Batool Bright MS CCC-SLP 59, GN 1          Batool Bright MS CCC-SLP  1/12/2017   Yes

## 2024-04-16 NOTE — H&P ADULT - NSHPREVIEWOFSYSTEMS_GEN_ALL_CORE
CONSTITUTIONAL/GENERAL: No weakness, fevers or chills  EYES/OPHTHALMOLOGIC: No visual changes, No blurry vision, No vertigo   ENMT: No throat pain, or neck stiffness   RESPIRATORY/THORAX: No cough, wheezing, hemoptysis; +shortness of breath as described in HPI   CARDIOVASCULAR: No chest pain or palpitations  GASTROINTESTINAL: No abdominal or epigastric pain. No nausea, vomiting, or hematemesis; No diarrhea or constipation. No melena or hematochezia.  GENITOURINARY: No dysuria, frequency or hematuria  NEUROLOGICAL: No numbness or weakness  SKIN: No itching, rashes  ENDOCRINOLOGY: no heat intolerance, no cold intolerance, no weight gain, + intentional weight loss as described in hpi  PSYCHIATRIC:  no si/no hi, mood stable, no anxiety  MUSCULOSKELETAL SYSTEM:  no joint pain, no myalgias, no arthralgias, no joint swelling

## 2024-04-16 NOTE — H&P ADULT - PROBLEM SELECTOR PLAN 4
Diet: NPO for cath  DVT ppx: holding for now given cath, can be started on lovenox afterwards  Dispo: pending cardio-pulmonary workup

## 2024-04-16 NOTE — CONSULT NOTE ADULT - ATTENDING COMMENTS
48 year old male with no past medical history who presents with increasing SOB.     The patient has been having SOB and JOSHI for the last 2 days with non-specific chest discomfort. Patient had previously been a smoker, quit 1 year ago. has some chronic cough with clear/yellow sputum, also noted for JOSHI with stairs since he quit smoking. Patient in the ED noted to have CXR with some pulmonary edema  but normal BNP. TTE however severe LV dysfunction. Of note the patient used to work in construction but also with W[employer] exposure without respirator.     # HFrEF  # SOB/JOSHI  # W[employer] exposure  - Symptoms most consistent with cardiac etiology, appears euvolemic, no PLEF, CXR appears worse due to habitus, low BNP  - Will f/u results of the LHC and LVEDP pressures.   - Less likely to be primary lung disease. If no improvement will consider CT scan  - Will need outpatient pulm for PFTs and sleep study so asses for STACY  - Will follow up after results of the LHC. If elevated LVEDP can consider IV diuresis

## 2024-04-16 NOTE — ED CDU PROVIDER DISPOSITION NOTE - CLINICAL COURSE
48M former smoker p/w worsening sob and cough. In ED, workup included CXR which showed mild cardiomegaly and mild pulmonary venous congestion. Labs notable for pBNP of 204, troponon 16-->17. PT dispo to CDU for tele monitoring, tele doc of the day assessment, TTE and stress. TTE performed which showed severe LV dysfunction with hypertrophy and EF of 15-20%, otherwise with normal RV size and function and valvular function. Pt seen by cardiology overnight who initially recommended stress but unable to perform due to poor LV function. Discussed with cardiology, plan for admission to tele hospitalist for ongoing workup and optimization.

## 2024-04-16 NOTE — H&P ADULT - PROBLEM SELECTOR PLAN 2
Worsening shortness of breath, likely cardiac related but given exposure to [employer] and smoking history, cannot rule out pulmonary etiology at this time  - ED consulted pulm - f/u recs  - obtain CT chest non-con  - f/u cath results

## 2024-04-16 NOTE — H&P ADULT - NSHPSOCIALHISTORY_GEN_ALL_CORE
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

## 2024-04-16 NOTE — ED PROVIDER NOTE - CARE PLAN
----- Message from Alondra Collins MD sent at 4/14/2024  1:49 PM CDT -----  Please inform Andrea Ross  about labs results :   kidney liver function is ok , lipid acceptable, not anemic , Thyroid is ok , fasting sugar high rec less carb diet ,Continue life style modifications .  Thanks,  Dr Collins    
2nd attempt lvm for pt with result sending result to pt portal.  
Principal Discharge DX:	Penile abnormality

## 2024-04-16 NOTE — H&P ADULT - HISTORY OF PRESENT ILLNESS
Patient is a 48 year old male with no past medical history who presents to the ED after an episode of shortness of breath. States that 2 days ago he was not able to stop coughing and became extremely short of breath for about 50 seconds. This episode was not brought about with exertion, and the last time this occurred was about 1 year ago or so. States that he quit smoking a year ago ( after smoking 1 PPD x 5 years) and since then has been having worsening cough, sometimes with sputum. States that he has been getting more short of breath the past year as well, and now is having trouble going up one flight of stairs and feels winded by the time he gets up there. He states since quitting smoking, he has been working to lose weight and has lost about 30lbs in the past 6 months ( has been eating healthier and working out). He denies any swelling in his lower extremities, any PND, but does endorse episodes of choking in the middle of the night that is witnessed by his wife. States that it has happened a few times the past year.  Does not go to any doctors, takes no medications or OTC pills.    In the ED, VSS, CXR shows cardiomegaly and pulmonary vascular congestion, and TTE shows EF 15-20%, admitted for further workup.

## 2024-04-16 NOTE — CONSULT NOTE ADULT - ASSESSMENT
48-year-old male, former smoker for 5 years 1 PPD, stopped smoking 1 year ago. Pt. denies any past medical history and is presenting to the ED with shortness of breath which has been ongoing for the last year. Today he said that he could not stop coughing and then became extremely short of breath. Pt. states that he his shortness of breath occurs on exertion and he is unable to tolerate walking one block or a flight of stairs without becoming severly short of breath. He is able to lay flat and sleeps on will pillow while flat at night. Patient is a former  and worked at [employer] on the ground.     On Physical exam, pt. does not appear fluid overloaded. His lung sounds are clear, he does not have an peripheral edema and is negative for JVD. He is lying flat on room air and appears in no acute distress. TTE obtained from today shows an EF 15-20%, increased LV mass with eccentric hypertrophy with trace mitral regurgitation. CXR reveals Mild cardiomegaly and mild, central pulmonary venous congestion,     Recommendations:  - Continue to monitor pt. on cardiac monitor  - CXR showed Mild cardiomegaly and mild, central pulmonary venous congestion  - Keep electrolytes K>4.0, Mg >2.0, Phos >3.0  - Keep pt. NPO after midnight for potential Cardiac Cath  vs CT Coronaries   - EF 15-20%, pBNP 204, does not appear to be in ADHF on exam  - Obtain Lactate   - Request pulm consult       Thank you, if any questions or clinical situation changes please call spectra #[id number].  Case discussed with on call ED[facility]IE fellow Dr. Santy Cabral  Attending Attestation to follow  
48M , [employer] exposure, former smoker, coming in with shortness fo breath found to have severe HFrEF of uncertain etiology    Pulmonary called for shortness fo breath and abnormal CXR    CXR may be suggestive of pulmonary venous hypertension 2/2 HFrEF, but also large habitus, may be an overcall  Suspect symptoms related to cardiomyopathy  Pending cath    - f/u cath  - GMT as per cardio/etiology of myopathy  - if symptoms do not resolve can consider advanced thoracic imaging  - outpatient sleep study/PFTs    Please notify pulmonary prior to discharge and email [email or web address] to schedule an appointment; please provide appointment info to patient    Follow up at  [address] [place] [address], [address]  [place] [address]  tel: [phone]  fax: [phone]

## 2024-04-16 NOTE — H&P ADULT - NSHPPHYSICALEXAM_GEN_ALL_CORE
GENERAL: NAD, well-developed  HEAD:  Atraumatic, Normocephalic  EYES: EOMI, PERRLA, conjunctiva and sclera clear  NECK: Supple, No JVD  CHEST/LUNG: Clear to auscultation bilaterally; No wheeze  HEART: Regular rate and rhythm; No murmurs, rubs, or gallops  ABDOMEN: Soft, Nontender, Nondistended; Bowel sounds present  EXTREMITIES:  2+ Peripheral Pulses, No clubbing, cyanosis, or edema  PSYCH: AAOx3, appropriate affect  NEUROLOGY: non-focal, harrison  SKIN: No rashes or lesions

## 2024-04-16 NOTE — H&P ADULT - PROBLEM SELECTOR PLAN 3
LFTs increased and CK also increased  - likely from poor perfusion due to low ef, but will get right upper quadrant ultrasound for now, monitor CMP daily

## 2024-04-16 NOTE — ED CDU PROVIDER SUBSEQUENT DAY NOTE - HISTORY
47 y/o male with past medical history of former smoker presents to ED complaining of shortness of breath and cough for 1 year.  Patient came to emergency room today because yesterday he had an episode of coughing attack and could not breathe that concerned him.  States his cough is productive with clear phlegm.  Was told by his primary care provider last year to see a cardiologist however never followed up. sent to cdu for tele monitoring, echocardiogram. will consult tele doc.    Interval hx: Patient seen resting comfortably in bed in NAD. Reports improvement of symptoms. Cxr: mild cardiomegaly and mild pulmonary venous congestion. pBNP showed 204. Echo results showed EF 15-20%, increased LV mass with hypertrophy. Cardiology consulted and recommends Stress in AM, NPO after midnight, Lactate and Pulm consult.  Patient is aware of plan and is agreeable. 49 y/o male with past medical history of former smoker presents to ED complaining of shortness of breath and cough for 1 year.  Patient came to emergency room today because yesterday he had an episode of coughing attack and could not breathe that concerned him.  States his cough is productive with clear phlegm.  Was told by his primary care provider last year to see a cardiologist however never followed up. sent to cdu for tele monitoring, echocardiogram. will consult tele doc.    Interval hx: Patient seen resting comfortably in bed in NAD. Reports improvement of symptoms. Cxr: mild cardiomegaly and mild pulmonary venous congestion. pBNP showed 204. Echo results showed EF 15-20%, increased LV mass with hypertrophy. Cardiology consulted and recommends Stress in AM, NPO after midnight, Lactate and Pulm consult. On tele monitoring patient had 3 runs of Vtach, cardiology aware.  Patient is aware of plan and is agreeable.

## 2024-04-16 NOTE — ED CDU PROVIDER SUBSEQUENT DAY NOTE - ATTENDING APP SHARED VISIT CONTRIBUTION OF CARE
48M former smoker p/w worsening sob and cough. In ED, workup included CXR which showed mild cardiomegaly and mild pulmonary venous congestion. Labs notable for pBNP of 204, troponon 16-->17. PT dispo to CDU for tele monitoring, tele doc of the day assessment, TTE and stress. TTE performed which showed severe LV dysfunction with hypertrophy and EF of 15-20%, otherwise with normal RV size and function and valvular function. Pt seen by cardiology overnight who initially recommended stress but unable to perform due to poor LV function. Awaiting repeat cardiology assessment, likely admission for diagnostic cath.

## 2024-04-16 NOTE — H&P ADULT - NSHPLABSRESULTS_GEN_ALL_CORE
EKG personally reviewed: NSR with , LVH, twi lead 1, avl  CXR personally reviewed: Cardiomegaly, mild pulmonary vascular congestion, no pleff n  TTE:  < from: TTE W or WO Ultrasound Enhancing Agent (04.15.24 @ 17:19) >     1. The left ventricular cavity is severely dilated. Left ventricular wall thickness is normal. Left ventricular systolic function is severely decreased with an ejection fraction visually estimated at 15-20%. There is global left ventricular hypokinesis. There is mild (grade 1) left ventricular diastolic dysfunction. There is increased LV mass and eccentric hypertrophy. There is no evidence of a left ventricular thrombus.   2. Normal right ventricular cavity size and normal systolic function.   3. Structurally normal mitral valve with normal leaflet excursion. There is trace mitral regurgitation.   4. The left atrium is mildly dilated with an indexed volume of 34.48 ml/m².   5. No prior echocardiogram is available for comparison.    < end of copied text >        Labs below are personally reviewed:

## 2024-04-16 NOTE — ED CDU PROVIDER SUBSEQUENT DAY NOTE - CLINICAL SUMMARY MEDICAL DECISION MAKING FREE TEXT BOX
47 y/o male with past medical history of former smoker presents to ED complaining of shortness of breath and cough for 1 year.  Patient came to emergency room today because yesterday he had an episode of coughing attack and could not breathe that concerned him. sent to cdu for tele monitoring, echocardiogram. will consult tele doc. Patient seen resting comfortably in bed in NAD. Reports improvement of symptoms. Cxr: mild cardiomegaly and mild pulmonary venous congestion. pBNP showed 204. Echo results showed EF 15-20%, increased LV mass with hypertrophy. Cardiology consulted and recommends Stress in AM, NPO after midnight, Lactate and Pulm consult.  Patient is aware of plan and is agreeable.

## 2024-04-16 NOTE — H&P ADULT - PROBLEM SELECTOR PLAN 1
Presents with worsening shortness of breath, cardiomegaly on CXR and PVC. TTE shows severely dilated LV cavity, EF 15-20% with global  left ventricular hypokinesis, mild (grade 1) left ventricular diastolic dysfunction with " increased LV mass and eccentric hypertrophy." and otherwise normal RVSF, trace mitral regurg, and mildly dilated LA  - currently euvolemic and does not require diuretics  - unclear etiology, but possibly patient has been having long standing undiagnosed HTN ( LVH on EKG) and also STACY (though normal right sided pressures).   - obtain TSH, Lipids, A1c  - Cards recs appreciated - cath 4/16  - GDMT after cath

## 2024-04-17 ENCOUNTER — TRANSCRIPTION ENCOUNTER (OUTPATIENT)
Age: 49
End: 2024-04-17

## 2024-04-17 VITALS
TEMPERATURE: 98 F | SYSTOLIC BLOOD PRESSURE: 119 MMHG | OXYGEN SATURATION: 98 % | HEART RATE: 82 BPM | RESPIRATION RATE: 17 BRPM | DIASTOLIC BLOOD PRESSURE: 72 MMHG

## 2024-04-17 LAB
FLUAV AG NPH QL: SIGNIFICANT CHANGE UP
FLUBV AG NPH QL: SIGNIFICANT CHANGE UP
RSV RNA NPH QL NAA+NON-PROBE: SIGNIFICANT CHANGE UP
SARS-COV-2 RNA SPEC QL NAA+PROBE: SIGNIFICANT CHANGE UP

## 2024-04-17 PROCEDURE — 99232 SBSQ HOSP IP/OBS MODERATE 35: CPT | Mod: GC

## 2024-04-17 PROCEDURE — 99239 HOSP IP/OBS DSCHRG MGMT >30: CPT

## 2024-04-17 PROCEDURE — 99232 SBSQ HOSP IP/OBS MODERATE 35: CPT

## 2024-04-17 RX ORDER — ACETAMINOPHEN 500 MG
650 TABLET ORAL EVERY 6 HOURS
Refills: 0 | Status: DISCONTINUED | OUTPATIENT
Start: 2024-04-17 | End: 2024-04-17

## 2024-04-17 RX ORDER — METOPROLOL TARTRATE 50 MG
1 TABLET ORAL
Qty: 30 | Refills: 0
Start: 2024-04-17 | End: 2024-05-16

## 2024-04-17 RX ORDER — DAPAGLIFLOZIN 10 MG/1
1 TABLET, FILM COATED ORAL
Qty: 30 | Refills: 0
Start: 2024-04-17 | End: 2024-05-16

## 2024-04-17 RX ORDER — METOPROLOL TARTRATE 50 MG
25 TABLET ORAL DAILY
Refills: 0 | Status: DISCONTINUED | OUTPATIENT
Start: 2024-04-17 | End: 2024-04-17

## 2024-04-17 RX ORDER — FUROSEMIDE 40 MG
40 TABLET ORAL ONCE
Refills: 0 | Status: COMPLETED | OUTPATIENT
Start: 2024-04-17 | End: 2024-04-17

## 2024-04-17 RX ORDER — FUROSEMIDE 40 MG
1 TABLET ORAL
Qty: 30 | Refills: 0
Start: 2024-04-17 | End: 2024-05-16

## 2024-04-17 RX ORDER — ACETAMINOPHEN 500 MG
650 TABLET ORAL ONCE
Refills: 0 | Status: COMPLETED | OUTPATIENT
Start: 2024-04-17 | End: 2024-04-17

## 2024-04-17 RX ADMIN — Medication 650 MILLIGRAM(S): at 17:30

## 2024-04-17 RX ADMIN — Medication 40 MILLIGRAM(S): at 11:01

## 2024-04-17 RX ADMIN — Medication 650 MILLIGRAM(S): at 06:52

## 2024-04-17 RX ADMIN — Medication 650 MILLIGRAM(S): at 05:52

## 2024-04-17 RX ADMIN — Medication 650 MILLIGRAM(S): at 16:33

## 2024-04-17 NOTE — DISCHARGE NOTE PROVIDER - NSDCCPTREATMENT_GEN_ALL_CORE_FT
PRINCIPAL PROCEDURE  Procedure: Transthoracic echo  Findings and Treatment: 1. The left ventricular cavity is severely dilated. Left ventricular wall thickness is normal. Left ventricular systolic function is severely decreased with an ejection fraction visually estimated at 15-20%. There is global left ventricular hypokinesis. There is mild (grade 1) left ventricular diastolic dysfunction. There is increased LV mass and eccentric hypertrophy. There is no evidence of a left ventricular thrombus.   2. Normal right ventricular cavity size and normal systolic function.   3. Structurally normal mitral valve with normal leaflet excursion. There is trace mitral regurgitation.   4. The left atrium is mildly dilated with an indexed volume of 34.48 ml/m².   5. No prior echocardiogram is available for comparison.

## 2024-04-17 NOTE — DISCHARGE NOTE NURSING/CASE MANAGEMENT/SOCIAL WORK - NSDCPEWEB_GEN_ALL_CORE
[facility] website --- [email or web address] website --- [email or web address]

## 2024-04-17 NOTE — PROGRESS NOTE ADULT - SUBJECTIVE AND OBJECTIVE BOX
Patient seen and examined at bedside.    Overnight Events: No overnight events. Denies chest pain, shortness of breath.     Review Of Systems: No chest pain, shortness of breath, or palpitations            Current Meds:  influenza   Vaccine 0.5 milliLiter(s) IntraMuscular once  melatonin 3 milliGRAM(s) Oral at bedtime PRN      Vitals:  T(F): 97.6 (-), Max: 98.2 (-17)  HR: 78 (-) (74 - 97)  BP: 111/84 (-) (100/82 - 122/81)  RR: 18 (-)  SpO2: 97% (-)  I&O's Summary      Physical Exam:  Appearance: No acute distress; well appearing  Eyes: PERRL, EOMI, pink conjunctiva  HEENT: Normal oral mucosa  Cardiovascular: RRR, S1, S2, no murmurs, rubs, or gallops; no edema; no JVD  Respiratory: Clear to auscultation bilaterally  Gastrointestinal: soft, non-tender, non-distended with normal bowel sounds  Extremities: No LE edema, warm extremties, 2+ DP  Musculoskeletal: No clubbing; no joint deformity                             16.3   4.28  )-----------( 206      ( 15 Apr 2024 10:00 )             50.3     04-16    137  |  100  |  12  ----------------------------<  89  4.5   |  25  |  0.97    Ca    9.1      2024 13:45  Phos  3.3     04-16  Mg     1.80     04-16    TPro  7.5  /  Alb  4.1  /  TBili  0.6  /  DBili  x   /  AST  37  /  ALT  40  /  AlkPhos  46  04-16    PT/INR - ( 2024 13:45 )   PT: 12.8 sec;   INR: 1.14 ratio         PTT - ( 2024 13:45 )  PTT:31.5 sec  CARDIAC MARKERS ( 2024 13:45 )  x     / x     / x     / 824 U/L / x     / x      CARDIAC MARKERS ( 15 Apr 2024 11:40 )  17 ng/L / x     / x     / 1098 U/L / x     / 4.0 ng/mL  CARDIAC MARKERS ( 15 Apr 2024 10:00 )  16 ng/L / x     / x     / x     / x     / x            Total Cholesterol: 151  LDL: --  HDL: 34  T        New ECG(s): Personally reviewed    Echo:    < from: TTE W or WO Ultrasound Enhancing Agent (04.15.24 @ 17:19) >  Referring Physician:    ED CDU  Interpreting Physician: Manoj Mcmullen M.D.  Primary Sonographer:    Denisha Saldana [facility]    CPT:                ECHO TTE WITH CON COMP W DOPP - .m;DEFINITY ECHO                      CONTRAST PER ML - .m  Indication(s):      Chronic ischemic heart disease, unspecified - I25.9  Procedure:          Transthoracic echocardiogram with 2-D, M-mode and complete                      spectral and color flow Doppler.  Ordering Location:  [facility]  Admission Status:   ED  Contrast Injection: Verbal consent was obtained for injection of Ultrasonic                      Enhancing Agent following a discussion of risks and                      benefits.                      Endocardial visualization enhanced with 2 ml of Definity                      Ultrasound enhancing agent (Lot#:[id number] Exp.Date:                      Discarded Dose:8ml).  UEA Reaction:       Patient had no adverse reaction after injection of                      Ultrasound Enhancing Agent.  Study Information:  Image quality for this study is good.    _______________________________________________________________________________________     CONCLUSIONS:      1. The left ventricular cavity is severely dilated. Left ventricular wall thickness is normal. Left ventricular systolic function is severely decreased with an ejection fraction visually estimated at 15-20%. There is global left ventricular hypokinesis. There is mild (grade 1) left ventricular diastolic dysfunction. There is increased LV mass and eccentric hypertrophy. There is no evidence of a left ventricular thrombus.   2. Normal right ventricular cavity size and normal systolic function.   3. Structurally normal mitral valve with normal leaflet excursion. There is trace mitral regurgitation.   4. The left atrium is mildly dilated with an indexed volume of 34.48 ml/m².   5. No prior echocardiogram is available for comparison.    ________________________________________________________________________________________  FINDINGS:     Left Ventricle:  The left ventricular cavity is severely dilated. Left ventricular wall thickness is normal. Left ventricular systolic function is severely decreased with an ejection fraction visually estimated at 15-20%. There is global left ventricular hypokinesis. There is mild (grade 1) left ventricular diastolicdysfunction. There is increased LV mass and eccentric hypertrophy. There is no evidence of a left ventricular thrombus.     Right Ventricle:  The right ventricular cavity is normal in size and normal systolic function. Tricuspid annular plane systolic excursion (TAPSE) is 3.3 cm (normal >=1.7 cm).     Left Atrium:  The left atrium is mildly dilated with an indexed volume of 34.48 ml/m².     Right Atrium:  The right atrium is normal in size with an indexed volume of 21.95 ml/m² and an indexed areaof 7.56 cm²/m².     Aortic Valve:  The aortic valve anatomy cannot be determined. There is no evidence of aortic regurgitation.     Mitral Valve:  Structurally normal mitral valve with normal leaflet excursion. There is trace mitral regurgitation.     Tricuspid Valve:  Structurally normal tricuspid valve with normal leaflet excursion. There is trace tricuspid regurgitation.     Pulmonic Valve:  Structurally normal pulmonic valve with normal leaflet excursion. There is trace pulmonic regurgitation.     Aorta:  The aortic root at the sinuses of Valsalva is normal in size, measuring 4.10 cm (indexed 1.77 cm/m²). The ascending aorta diameter is normal in size, measuring 3.80 cm (indexed 1.64 cm/m²).     Pericardium:  No pericardial effusion seen.     Systemic Veins:  The inferior vena cava is normal in size measuring 1.70 cm in diameter, (normal <2.1cm) with abnormal inspiratory collapse (abnormal <50%) consistent with mildly elevated right atrial pressure (~8, range 5-10mmHg).    < end of copied text >        Stress Testing:     Cath:    No evidence of CAD    Imaging:    Interpretation of Telemetry:   Patient seen and examined at bedside.    Overnight Events: No overnight events. Denies chest pain, shortness of breath.   Cath was done, cardiac cath shows no CAD, LVEDP 19    Review Of Systems: No chest pain, shortness of breath, or palpitations            Current Meds:  influenza   Vaccine 0.5 milliLiter(s) IntraMuscular once  melatonin 3 milliGRAM(s) Oral at bedtime PRN      Vitals:  T(F): 97.6 (-), Max: 98.2 (-)  HR: 78 (-) (74 - 97)  BP: 111/84 (-) (100/82 - 122/81)  RR: 18 (-)  SpO2: 97% ()  I&O's Summary      Physical Exam:  Appearance: No acute distress; well appearing  Eyes: PERRL, EOMI, pink conjunctiva  HEENT: Normal oral mucosa  Cardiovascular: RRR, S1, S2, no murmurs, rubs, or gallops; no edema; no JVD  Respiratory: Clear to auscultation bilaterally  Gastrointestinal: soft, non-tender, non-distended with normal bowel sounds  Extremities: No LE edema, warm extremties, 2+ DP  Musculoskeletal: No clubbing; no joint deformity                             16.3   4.28  )-----------( 206      ( 15 Apr 2024 10:00 )             50.3     04-16    137  |  100  |  12  ----------------------------<  89  4.5   |  25  |  0.97    Ca    9.1      2024 13:45  Phos  3.3     04-16  Mg     1.80     04-16    TPro  7.5  /  Alb  4.1  /  TBili  0.6  /  DBili  x   /  AST  37  /  ALT  40  /  AlkPhos  46  04-16    PT/INR - ( 2024 13:45 )   PT: 12.8 sec;   INR: 1.14 ratio         PTT - ( 2024 13:45 )  PTT:31.5 sec  CARDIAC MARKERS ( 2024 13:45 )  x     / x     / x     / 824 U/L / x     / x      CARDIAC MARKERS ( 15 Apr 2024 11:40 )  17 ng/L / x     / x     / 1098 U/L / x     / 4.0 ng/mL  CARDIAC MARKERS ( 15 Apr 2024 10:00 )  16 ng/L / x     / x     / x     / x     / x            Total Cholesterol: 151  LDL: --  HDL: 34  T        New ECG(s): Personally reviewed    Echo:    < from: TTE W or WO Ultrasound Enhancing Agent (04.15.24 @ 17:19) >  Referring Physician:    ED CDU  Interpreting Physician: Manoj Mcmullen M.D.  Primary Sonographer:    Denisha Saldana [facility]    CPT:                ECHO TTE WITH CON COMP W DOPP - .m;DEFINITY ECHO                      CONTRAST PER ML - .m  Indication(s):      Chronic ischemic heart disease, unspecified - I25.9  Procedure:          Transthoracic echocardiogram with 2-D, M-mode and complete                      spectral and color flow Doppler.  Ordering Location:  [facility]  Admission Status:   ED  Contrast Injection: Verbal consent was obtained for injection of Ultrasonic                      Enhancing Agent following a discussion of risks and                      benefits.                      Endocardial visualization enhanced with 2 ml of Definity                      Ultrasound enhancing agent (Lot#:[id number] Exp.Date:                      Discarded Dose:8ml).  UEA Reaction:       Patient had no adverse reaction after injection of                      Ultrasound Enhancing Agent.  Study Information:  Image quality for this study is good.    _______________________________________________________________________________________     CONCLUSIONS:      1. The left ventricular cavity is severely dilated. Left ventricular wall thickness is normal. Left ventricular systolic function is severely decreased with an ejection fraction visually estimated at 15-20%. There is global left ventricular hypokinesis. There is mild (grade 1) left ventricular diastolic dysfunction. There is increased LV mass and eccentric hypertrophy. There is no evidence of a left ventricular thrombus.   2. Normal right ventricular cavity size and normal systolic function.   3. Structurally normal mitral valve with normal leaflet excursion. There is trace mitral regurgitation.   4. The left atrium is mildly dilated with an indexed volume of 34.48 ml/m².   5. No prior echocardiogram is available for comparison.    ________________________________________________________________________________________  FINDINGS:     Left Ventricle:  The left ventricular cavity is severely dilated. Left ventricular wall thickness is normal. Left ventricular systolic function is severely decreased with an ejection fraction visually estimated at 15-20%. There is global left ventricular hypokinesis. There is mild (grade 1) left ventricular diastolicdysfunction. There is increased LV mass and eccentric hypertrophy. There is no evidence of a left ventricular thrombus.     Right Ventricle:  The right ventricular cavity is normal in size and normal systolic function. Tricuspid annular plane systolic excursion (TAPSE) is 3.3 cm (normal >=1.7 cm).     Left Atrium:  The left atrium is mildly dilated with an indexed volume of 34.48 ml/m².     Right Atrium:  The right atrium is normal in size with an indexed volume of 21.95 ml/m² and an indexed areaof 7.56 cm²/m².     Aortic Valve:  The aortic valve anatomy cannot be determined. There is no evidence of aortic regurgitation.     Mitral Valve:  Structurally normal mitral valve with normal leaflet excursion. There is trace mitral regurgitation.     Tricuspid Valve:  Structurally normal tricuspid valve with normal leaflet excursion. There is trace tricuspid regurgitation.     Pulmonic Valve:  Structurally normal pulmonic valve with normal leaflet excursion. There is trace pulmonic regurgitation.     Aorta:  The aortic root at the sinuses of Valsalva is normal in size, measuring 4.10 cm (indexed 1.77 cm/m²). The ascending aorta diameter is normal in size, measuring 3.80 cm (indexed 1.64 cm/m²).     Pericardium:  No pericardial effusion seen.     Systemic Veins:  The inferior vena cava is normal in size measuring 1.70 cm in diameter, (normal <2.1cm) with abnormal inspiratory collapse (abnormal <50%) consistent with mildly elevated right atrial pressure (~8, range 5-10mmHg).    < end of copied text >        Stress Testing:     Cath:    No evidence of CAD    Imaging:    Interpretation of Telemetry:

## 2024-04-17 NOTE — DISCHARGE NOTE PROVIDER - NSDCMRMEDTOKEN_GEN_ALL_CORE_FT
Farxiga 10 mg oral tablet: 1 tab(s) orally once a day   Farxiga 10 mg oral tablet: 1 tab(s) orally once a day  Lasix 20 mg oral tablet: 1 tab(s) orally once a day as needed for  shortness of breath and/or wheezing TAKE AS NEEDED FOR SHORTNESS OF BREATH OR LEG SWELLING  metoprolol succinate 25 mg oral tablet, extended release: 1 tab(s) orally once a day

## 2024-04-17 NOTE — DISCHARGE NOTE PROVIDER - NSDCCPCAREPLAN_GEN_ALL_CORE_FT
PRINCIPAL DISCHARGE DIAGNOSIS  Diagnosis: Acute systolic congestive heart failure  Assessment and Plan of Treatment: You were evaluated for shortness of breath. You were found to have acute heart failure. Your ejection fraction is 15-20%. You underwent angiogram with no blockages of heart vessels were noted. You were started on new medications. Please take Lasix 20mg as needed once daily when you feel short of breath. Please follow up with your cardiologist. Please also follow up with pulmonologist.

## 2024-04-17 NOTE — PROGRESS NOTE ADULT - ATTENDING COMMENTS
48 year old male with no past medical history who presents with increasing SOB.     The patient has been having SOB and JOSHI for the last 2 days with non-specific chest discomfort. Patient had previously been a smoker, quit 1 year ago. has some chronic cough with clear/yellow sputum, also noted for JOSHI with stairs since he quit smoking. Patient in the ED noted to have CXR with some pulmonary edema  but normal BNP. TTE however severe LV dysfunction. Of note the patient used to work in construction but also with [employer] exposure without respirator.     S/P LHC without OB CAD but with elevated LVEDP. Felling better with diuresis.     # HFrEF  # SOB/JOSHI  # W[employer] exposure  - Symptoms most consistent with cardiac etiology, appears euvolemic, no PLEF, CXR appears worse due to habitus, low BNP  - Prelim LHC with elevated LVEDP- improved with diuresis. Diuresis per cards/medicine team.   - F/U official [facility] results.   - Less likely to be primary lung diseasee  - Will need outpatient pulm for PFTs and sleep study so asses for STACY  - Instruction for follow up as above.
Pt seen and evaluated. Agree with findings and plans above.

## 2024-04-17 NOTE — PROGRESS NOTE ADULT - PROBLEM SELECTOR PLAN 3
LFTs increased and CK also increased  - likely from poor perfusion due to low ef  -LFT normalized   -US Abdomen : No gallstones, gallbladder wall thickening or pericholecystic   fluid. Extrahepatic CBD not visualized. Limited visualization of the   liver. No intrahepatic biliary ductal dilatation. No focal hepatic masses.

## 2024-04-17 NOTE — PROGRESS NOTE ADULT - SUBJECTIVE AND OBJECTIVE BOX
PATIENT:  FLORIDA PETERS  [id number]    CHIEF COMPLAINT:  Patient is a 48y old  Male who presents with a chief complaint of EF 15-20% (17 Apr 2024 07:30)      INTERVAL HISTORY/OVERNIGHT EVENTS:  - no acute events    MEDICATIONS:  MEDICATIONS  (STANDING):  metoprolol succinate ER 25 milliGRAM(s) Oral daily    MEDICATIONS  (PRN):  melatonin 3 milliGRAM(s) Oral at bedtime PRN Insomnia      ALLERGIES:  Allergies    No Known Allergies    Intolerances        OBJECTIVE:  ICU Vital Signs Last 24 Hrs  T(C): 36.7 (17 Apr 2024 11:00), Max: 36.8 (17 Apr 2024 00:55)  T(F): 98 (17 Apr 2024 11:00), Max: 98.2 (17 Apr 2024 00:55)  HR: 70 (17 Apr 2024 11:00) (70 - 97)  BP: 108/78 (17 Apr 2024 11:00) (100/82 - 122/81)  RR: 16 (17 Apr 2024 11:00) (16 - 18)  SpO2: 99% (17 Apr 2024 11:00) (95% - 100%)    O2 Parameters below as of 17 Apr 2024 11:00  Patient On (Oxygen Delivery Method): room air    PHYSICAL EXAMINATION:  General: WN/WD NAD  HEENT: PERRLA, EOMI, moist mucous membranes  Neurology: A&Ox3, nonfocal, WINTERS x 4  Respiratory: CTA B/L, normal respiratory effort, no wheezes, crackles, rales  CV: RRR, S1S2, no murmurs, rubs or gallops  Abdominal: Soft, NT, ND +BS, Last BM  Extremities: No edema, + peripheral pulses    LABS:      04-16    137  |  100  |  12  ----------------------------<  89  4.5   |  25  |  0.97    Ca    9.1      16 Apr 2024 13:45  Phos  3.3     04-16  Mg     1.80     04-16    TPro  7.5  /  Alb  4.1  /  TBili  0.6  /  DBili  x   /  AST  37  /  ALT  40  /  AlkPhos  46  04-16    LIVER FUNCTIONS - ( 16 Apr 2024 13:45 )  Alb: 4.1 g/dL / Pro: 7.5 g/dL / ALK PHOS: 46 U/L / ALT: 40 U/L / AST: 37 U/L / GGT: 28 U/L       PT/INR - ( 16 Apr 2024 13:45 )   PT: 12.8 sec;   INR: 1.14 ratio         PTT - ( 16 Apr 2024 13:45 )  PTT:31.5 sec  Creatine Kinase, Serum: 824 U/L (04-16 @ 13:45)    CARDIAC MARKERS ( 16 Apr 2024 13:45 )  x     / x     / 824 U/L / x     / x          Urinalysis Basic - ( 16 Apr 2024 13:45 )    Color: x / Appearance: x / SG: x / pH: x  Gluc: 89 mg/dL / Ketone: x  / Bili: x / Urobili: x   Blood: x / Protein: x / Nitrite: x   Leuk Esterase: x / RBC: x / WBC x   Sq Epi: x / Non Sq Epi: x / Bacteria: x

## 2024-04-17 NOTE — PROGRESS NOTE ADULT - ASSESSMENT
48-year-old male, former smoker for 5 years 1 PPD, stopped smoking 1 year ago. Pt. denies any past medical history and is presenting to the ED with shortness of breath which has been ongoing for the last year. Today he said that he could not stop coughing and then became extremely short of breath. Pt. states that he his shortness of breath occurs on exertion and he is unable to tolerate walking one block or a flight of stairs without becoming severly short of breath. He is able to lay flat and sleeps on will pillow while flat at night. Patient is a former  and worked at [employer] on the ground.     On Physical exam, pt. does not appear fluid overloaded. His lung sounds are clear, he does not have an peripheral edema and is negative for JVD. He is lying flat on room air and appears in no acute distress. TTE obtained from today shows an EF 15-20%, increased LV mass with eccentric hypertrophy with trace mitral regurgitation. CXR reveals Mild cardiomegaly and mild, central pulmonary venous congestion,     Recommendations:  - Cath shows no evidence of CAD  - CXR showed Mild cardiomegaly and mild, central pulmonary venous congestion  - Keep electrolytes K>4.0, Mg >2.0, Phos >3.0  - EF 15-20%, pBNP 204, does not appear to be in ADHF on exam  - Appreciate pulm recs 48-year-old male, former smoker for 5 years 1 PPD, stopped smoking 1 year ago. Pt. denies any past medical history and is presenting to the ED with shortness of breath which has been ongoing for the last year. Today he said that he could not stop coughing and then became extremely short of breath. Pt. states that he his shortness of breath occurs on exertion and he is unable to tolerate walking one block or a flight of stairs without becoming severly short of breath. He is able to lay flat and sleeps on will pillow while flat at night. Patient is a former  and worked at [employer] on the ground.     On Physical exam, pt. does not appear fluid overloaded. His lung sounds are clear, he does not have an peripheral edema and is negative for JVD. He is lying flat on room air and appears in no acute distress. TTE obtained from today shows an EF 15-20%, increased LV mass with eccentric hypertrophy with trace mitral regurgitation. CXR reveals Mild cardiomegaly and mild, central pulmonary venous congestion,     Recommendations:  - Cath shows no evidence of CAD  - CXR showed Mild cardiomegaly and mild, central pulmonary venous congestion  - LVEDP slightly elevated at 19, can give lasix 40mg IV once  - Keep electrolytes K>4.0, Mg >2.0, Phos >3.0  - EF 15-20%, pBNP 204  - Appreciate pulm recs 48-year-old male, former smoker for 5 years 1 PPD, stopped smoking 1 year ago. Pt. denies any past medical history and is presenting to the ED with shortness of breath which has been ongoing for the last year. Today he said that he could not stop coughing and then became extremely short of breath. Pt. states that he his shortness of breath occurs on exertion and he is unable to tolerate walking one block or a flight of stairs without becoming severly short of breath. He is able to lay flat and sleeps on will pillow while flat at night. Patient is a former  and worked at [employer] on the ground.     On Physical exam, pt. does not appear fluid overloaded. His lung sounds are clear, he does not have an peripheral edema and is negative for JVD. He is lying flat on room air and appears in no acute distress. TTE obtained from today shows an EF 15-20%, increased LV mass with eccentric hypertrophy with trace mitral regurgitation. CXR reveals Mild cardiomegaly and mild, central pulmonary venous congestion,     Recommendations:  - Cath shows no evidence of CAD  - EF 15-20%, pBNP 204  - CXR showed Mild cardiomegaly and mild, central pulmonary venous congestion  - LVEDP slightly elevated at 19, can give lasix 40mg IV once  - Start patient on metoprolol succinate 25mg daily  - Check if SGLT-2 is covered by patient insurance, if so, start patient on SGLT2 at discharge  - Keep electrolytes K>4.0, Mg >2.0, Phos >3.0  - Appreciate pulm recjosué Forbes MD  Cardiology fellow

## 2024-04-17 NOTE — PROGRESS NOTE ADULT - PROBLEM SELECTOR PLAN 1
Presents with worsening shortness of breath, cardiomegaly on CXR and PVC. TTE shows severely dilated LV cavity, EF 15-20% with global  left ventricular hypokinesis, mild (grade 1) left ventricular diastolic dysfunction with " increased LV mass and eccentric hypertrophy." and otherwise normal RVSF, trace mitral regurg, and mildly dilated LA  - s/p LHC- no CAD  - s/p IV lasix, per cards cont with lasix 20mg po qd prn for sob  - started on toprol 25mg qd   - will need uptitration of GDMT (can be done outpt)  - price check for SGLT2  - TSH, Lipids, A1c- reviewed   - Cards recs appreciated

## 2024-04-17 NOTE — DISCHARGE NOTE NURSING/CASE MANAGEMENT/SOCIAL WORK - PATIENT PORTAL LINK FT
You can access the FollowMyHealth Patient Portal offered by [facility] by registering at the following website: [email or web address]. By joining [employer]’s FollowMyHealth portal, you will also be able to view your health information using other applications (apps) compatible with our system.

## 2024-04-17 NOTE — DISCHARGE NOTE PROVIDER - HOSPITAL COURSE
Patient is a 48 year old male with no past medical history who presents to the ED after an episode of shortness of breath, found to have HFrEF, admitted for further workup.     #Acute HFrEF (heart failure with reduced ejection fraction).   ·  Plan: Presents with worsening shortness of breath, cardiomegaly on CXR and PVC. TTE shows severely dilated LV cavity, EF 15-20% with global  left ventricular hypokinesis, mild (grade 1) left ventricular diastolic dysfunction with " increased LV mass and eccentric hypertrophy." and otherwise normal RVSF, trace mitral regurg, and mildly dilated LA  - s/p LHC- no CAD  - s/p IV lasix, per cards cont with lasix 20mg po qd prn for sob  - started on toprol 25mg qd    - will need uptitration of GDMT (can be done outpt)  - price check for SGLT2  - TSH, Lipids, A1c- reviewed   - F/u with OP cardiology     # Shortness of breath.   ·  Plan: Worsening shortness of breath, likely cardiac related given HF. CXR with venous congestion.   additionally Evaluated by pulmonology, low suspicion for underlying lung etiology, recommends outpt fu.  PFT and CT chest outpt.  Pulmonology apt on DC    Patient is medically stable for DC with close outpatient Follow up.

## 2024-04-17 NOTE — PROGRESS NOTE ADULT - SUBJECTIVE AND OBJECTIVE BOX
[facility] Department of Hospital Medicine      Patient is a 48y old  Male who presents with a chief complaint of EF 15-20% (17 Apr 2024 13:21)    SUBJECTIVE: Pt seen and examined at bedside this morning. NAEO. VSS. patient denies having chest pain. shortness of breath is somewhat improved.     MEDICATIONS  (STANDING):  metoprolol succinate ER 25 milliGRAM(s) Oral daily    MEDICATIONS  (PRN):  melatonin 3 milliGRAM(s) Oral at bedtime PRN Insomnia      CAPILLARY BLOOD GLUCOSE      I&O's Summary      PHYSICAL EXAM:  Vital Signs Last 24 Hrs  T(C): 36.7 (17 Apr 2024 11:00), Max: 36.8 (17 Apr 2024 00:55)  T(F): 98 (17 Apr 2024 11:00), Max: 98.2 (17 Apr 2024 00:55)  HR: 70 (17 Apr 2024 11:00) (70 - 87)  BP: 108/78 (17 Apr 2024 11:00) (100/82 - 122/81)  BP(mean): --  RR: 16 (17 Apr 2024 11:00) (16 - 18)  SpO2: 99% (17 Apr 2024 11:00) (97% - 100%)    Parameters below as of 17 Apr 2024 11:00  Patient On (Oxygen Delivery Method): room air        CONSTITUTIONAL: NAD  HEAD: Normocephalic, atraumatic  EYES: EOMI, PERRL  ENT: no rhinorrhea, no pharyngeal erythema  RESPIRATORY: No increased work of breathing, CTAB, no wheezes or crackles appreciated  CARDIOVASCULAR: RRR, S1 and S2 present, no m/r/g  ABDOMEN: soft, NT, ND, bowel sounds present  EXTREMITIES: No LE edema  MUSCULOSKELETAL: no joint swelling, no tenderness to palpation  NEURO: A&Ox3, moving all extremities    LABS:    04-16    137  |  100  |  12  ----------------------------<  89  4.5   |  25  |  0.97    Ca    9.1      16 Apr 2024 13:45  Phos  3.3     04-16  Mg     1.80     04-16    TPro  7.5  /  Alb  4.1  /  TBili  0.6  /  DBili  x   /  AST  37  /  ALT  40  /  AlkPhos  46  04-16    PT/INR - ( 16 Apr 2024 13:45 )   PT: 12.8 sec;   INR: 1.14 ratio         PTT - ( 16 Apr 2024 13:45 )  PTT:31.5 sec  CARDIAC MARKERS ( 16 Apr 2024 13:45 )  x     / x     / 824 U/L / x     / x          Urinalysis Basic - ( 16 Apr 2024 13:45 )    Color: x / Appearance: x / SG: x / pH: x  Gluc: 89 mg/dL / Ketone: x  / Bili: x / Urobili: x   Blood: x / Protein: x / Nitrite: x   Leuk Esterase: x / RBC: x / WBC x   Sq Epi: x / Non Sq Epi: x / Bacteria: x          RADIOLOGY & ADDITIONAL TESTS:    Results Reviewed:   Imaging Personally Reviewed:  Electrocardiogram Personally Reviewed:    COORDINATION OF CARE:  Care Discussed with Consultants/Other Providers [Y/N]:  Prior or Outpatient Records Reviewed [Y/N]:

## 2024-04-17 NOTE — PROGRESS NOTE ADULT - PROBLEM SELECTOR PLAN 2
Worsening shortness of breath, likely cardiac related given HF.   additionally Evaluated by pulmonology, low suspicion for lung etiology, recommends outpt fu.  PFT and CT chest outpt

## 2024-04-17 NOTE — PROGRESS NOTE ADULT - ASSESSMENT
48M , [employer] [place] exposure, former smoker, coming in with shortness fo breath found to have severe HFrEF of uncertain etiology    Pulmonary called for shortness fo breath and abnormal CXR    CXR may be suggestive of pulmonary venous hypertension 2/2 HFrEF, but also large habitus, may be an overcall  Suspect symptoms related to cardiomyopathy  Pending cath    - cath clean, LVEDP 19   - GMT as per cardio/etiology of myopathy  - check RVP, full  - if symptoms do not resolve can consider advanced thoracic imaging  - outpatient sleep study/PFTs  - pulm to sign off at this time    Please notify pulmonary prior to discharge and email [email or web address] to schedule an appointment; please provide appointment info to patient    Follow up at  [address], [address]  [place] [address]  tel: [phone]  fax: [phone]

## 2024-04-17 NOTE — DISCHARGE NOTE PROVIDER - NSDCACTIVITY_GEN_ALL_CORE
Referred by: Kayla Humphrey MD; Medical Diagnosis (from order):    Diagnosis Information      Diagnosis    721.3 (ICD-9-CM) - M47.817 (ICD-10-CM) - Lumbosacral spondylosis without myelopathy    729.1 (ICD-9-CM) - M79.18 (ICD-10-CM) - Piriformis muscle pain    729.1 (ICD-9-CM) - M79.18 (ICD-10-CM) - Myofascial pain    724.6 (ICD-9-CM) - M53.3 (ICD-10-CM) - Sacroiliac joint pain    729.89 (ICD-9-CM) - R29.898 (ICD-10-CM) - Weakness of right lower extremity                Physical Therapy -  Discharge Summary    Visit:  7   Diagnosis Precautions: Past Medical History:  9/11/2013: Acquired spondylolisthesis  No date:  Anxiety  No date: Arthritis  No date: Carotid artery stenosis  No date: Cerumen impaction      Comment:  deep  No date: Chronic pain      Comment:  back  12/2018: Constipation  No date: Depression  No date: Dyslipidemia  No date: Edema with venous insufficiency  No date: Fibrocystic breast disease  No date: Fracture  No date: GERD (gastroesophageal reflux disease)  No date: Graves disease  No date: History of colonic polyps  No date: HTN (hypertension)  No date: Hypercholesterolemia  No date: Hyperlipidemia  4/20/2017: Hypothyroid      Comment:  Overview:  initially graves disease- s/p radioactive                iodine, currently on replacement  No date: Hypothyroidism  No date: Malignant neoplasm (CMS/[facility])      Comment:  right hip   No date: Osteopenia  No date: Psoriasis      Comment:  of the left elbow  No date: Reactive depression (situational)  2/19/2014: Sacroiliitis (CMS/[facility])  No date: Seizure disorder (CMS/[facility])      Comment:  last seizure was 1980  No date: Situational depression  No date: Sleep apnea      Comment:  cpap  No date: Spinal stenosis  No date: Tubular adenoma of colon  No date: Urinary incontinence  No date: Urinary tract infection    SUBJECTIVE                                                                                                             Patient reports she felt improvement for a week or two and then pain returned to baseline at initial visit. Patient feels significant pain in the right piriformis, general low back pain, and right sciatic nerve. Patient is limited on any movement due to the right buttock and lower extremity. Patient feels it is time to stop therapy and call and return to the doctor for futher assessment and possible injection. Functional Change:  Continued pain in AM and with walking / transfers. Decreased pain with sitting, but flared up this session  Current functional limitations: Intermittent right gluteal and lower extremity pain -- limiting all movement    Pain / Symptoms:  Pain rating (out of 10): Current: 7   Location: Right back, gluteal   Quality / Description: ache, burning, sore. OBJECTIVE                                                                                                                     Range of Motion (ROM)   (degrees unless noted; active unless noted; norms in ( ); negative=lacking to 0, positive=beyond 0)   Details / Comments: Lumbar objective testing not performed due to patient refusal due to pain.          Bed Mobility:     Sit to supine assist level:  Modified independent    Supine to sit assist level:  Modified independent    TREATMENT                                                                                                                  Therapeutic Exercise:  Nustep 5 minutes, level 3  Hooklying: gluteal isometric x 10, bent knee fall out x 10, heel slide x 10 right and left; hip abduction/adduction x 10  Hooklying with lower extremity on ball: hip and knee flexion x 10, lower trunk rotation x 10              Manual Therapy:    Gentle right hip distraction grade II/III, 5 x 10    Skilled input: verbal instruction/cues    Writer verbally educated and received verbal consent for hand placement, positioning of patient, and techniques to be performed today from patient for clothing adjustments for techniques, hand placement and palpation for techniques and therapist position for techniques as described above and how they are pertinent to the patient's plan of care. Home Exercise Program: * above  Access Code: [address]     Patient Portal:  [email or web address]     *modified piri stretch - supine without hookly, with Right ankle cross left , r leg fall out as able   *standing paper slides - at counter, hip abduction/add, hip extension/flex, outward circles left and right, pushing paper underfoot 5-10 ea build to 10 ea as able  Review: *seated hamstring stretch - 2-3 resp 23- x day (meals) 20-30 sec holds  assisted modified keila stretch right quad. Hip neutral, knee achieves 90 with notable stretch -  not to perform indep at home  * postural awareness - in sitting rib cage alignment over pelvis per strong left compensatory list for right hip hx;  Active use as resting position in sitting, will progress to active use in standing as indicated by safety    vHooklying: gluteal isometric x 10, marching x 10, bent knee fall out x 10, heel slide x 10 right and left; hip abduction/adduction x 10  Short arc quad with gluteal set over roll - cue breath support  Results: decreased pain  Reaction: no adverse reaction to treatment        ASSESSMENT                                                                                                             Patient demonstrates improving strength and muscle activation with the ability to lift both legs in supine and perform supine <> sit modified independent. Patient continues to have intermittent severe lumbar and right gluteal/lower extremity pain. Patient requests to be done with therapy at this time and return to her doctor for further evaluation.    Pain/symptoms after session: 5    Patient Education:   Results of above outlined education: Verbalizes understanding and Demonstrates understanding      PLAN Discharge from skilled therapy with instructions/recommendations to continue home exercise program follow up with referring provider    Suggestions for next session as indicated: Discharge from therapy per patient request      GOALS                                                                                                                       Long Term Goals: To be met by end of plan of care:      Home Exercise Program: Independent with progressed and modified home exercise program (HEP)      Status:  Met     Ambulation Time: Ambulate for 15 minutes, modified independent and with least restrictive device (Walking and moving (mobility))    Status:  Not met    Patient Reported Outcome Measure: Improvement in function /disability/impairment as indicated by Oswestry (minimal detectable change - 12%) , or =   20     Status:  Not met      Procedures and total treatment time documented Time Entry flowsheet. Walking - Indoors allowed/Walking - Outdoors allowed

## 2024-04-17 NOTE — DISCHARGE NOTE NURSING/CASE MANAGEMENT/SOCIAL WORK - NSDCPEEMAIL_GEN_ALL_CORE
[facility] Control email [email or web address]

## 2024-04-18 PROBLEM — Z00.00 ENCOUNTER FOR PREVENTIVE HEALTH EXAMINATION: Status: ACTIVE | Noted: 2024-04-18

## 2024-04-22 RX ORDER — ACETAMINOPHEN 500 MG
2 TABLET ORAL
Refills: 0 | DISCHARGE

## 2024-04-22 NOTE — CHART NOTE - NSCHARTNOTEFT_GEN_A_CORE
Post-Discharge Medication Review    Patient's preferred pharmacy was updated in [facility]: [facility]     Patient contacted to offer medication counseling post-discharge. Medication reconciliation completed. Per patient, medications include:    1.Farxiga 10 mg oral tablet 1 tab(s) orally once a day  2.Lasix 20 mg oral tablet 1 tab(s) orally once a day  TAKE AS NEEDED FOR SHORTNESS OF BREATH OR LEG SWELLING   3.metoprolol succinate 25 mg oral tablet, extended release 1 tab(s) orally once a day  Medications to be added to OMR (updated per discussion with patient):  4.Tylenol 500mg 2 once daily prn    Medication name, indication, administration, side effect, and monitoring reviewed for new medications during post discharge counseling visit with patient. Patient demonstrated understanding. Counseling offered for all medications. Post-Discharge Medication Review    Patient's preferred pharmacy was updated in O[facility]: [facility]     Patient contacted to offer medication counseling post-discharge. Medication reconciliation completed. Per patient, medications include:    1.Farxiga 10 mg oral tablet 1 tab(s) orally once a day  2.Lasix 20 mg oral tablet 1 tab(s) orally once a day  TAKE AS NEEDED FOR SHORTNESS OF BREATH OR LEG SWELLING   3.metoprolol succinate 25 mg oral tablet, extended release 1 tab(s) orally once a day  Medications to be added to OMR (updated per discussion with patient):  4.Tylenol 500mg 2 tablets once daily prn    Medication name, indication, administration, side effect, and monitoring reviewed for new medications during post discharge counseling visit with patient. Patient demonstrated understanding. Counseling offered for all medications.

## 2024-04-25 ENCOUNTER — APPOINTMENT (OUTPATIENT)
Dept: PULMONOLOGY | Facility: CLINIC | Age: 49
End: 2024-04-25
Payer: COMMERCIAL

## 2024-04-25 VITALS
SYSTOLIC BLOOD PRESSURE: 109 MMHG | HEART RATE: 84 BPM | DIASTOLIC BLOOD PRESSURE: 79 MMHG | HEIGHT: 72 IN | OXYGEN SATURATION: 97 % | BODY MASS INDEX: 34.27 KG/M2 | WEIGHT: 253 LBS

## 2024-04-25 DIAGNOSIS — Z82.49 FAMILY HISTORY OF ISCHEMIC HEART DISEASE AND OTHER DISEASES OF THE CIRCULATORY SYSTEM: ICD-10-CM

## 2024-04-25 DIAGNOSIS — R06.3 PERIODIC BREATHING: ICD-10-CM

## 2024-04-25 DIAGNOSIS — I50.20 UNSPECIFIED SYSTOLIC (CONGESTIVE) HEART FAILURE: ICD-10-CM

## 2024-04-25 DIAGNOSIS — Z87.891 PERSONAL HISTORY OF NICOTINE DEPENDENCE: ICD-10-CM

## 2024-04-25 PROCEDURE — 94729 DIFFUSING CAPACITY: CPT

## 2024-04-25 PROCEDURE — ZZZZZ: CPT

## 2024-04-25 PROCEDURE — 99205 OFFICE O/P NEW HI 60 MIN: CPT | Mod: 25

## 2024-04-25 PROCEDURE — 94060 EVALUATION OF WHEEZING: CPT

## 2024-04-25 PROCEDURE — 94726 PLETHYSMOGRAPHY LUNG VOLUMES: CPT

## 2024-04-25 NOTE — ASSESSMENT
[FreeTextEntry1] : Mr. Rodriguez is a 49 yo M with newly diagnosed nonischemic cardiomyopathy with EF 15-20% (as well as Grade I diastolic dysfunction, eccentric hypertrophy).  Concern for underlying sleep-disordered breathing based on history, possible Cheyne-Campbell vs STACY vs overlap of the two.  PFTs (4/25/24): FEV1/FVC 83, FEV1 (2.79) 76%, FVC (3.35) 73%, TLC 80%, RV 80%, DLCO 105% - normal spirometry, normal lung volumes, normal diffusion capacity.  Recommend: - Referred for inpatient sleep study underlining importance of scheduling as soon as possible as discussed risk of sleep-disordered breathing contributing to underlying cardiac arrhythmias particularly i/s/o cardiomyopathy - Follow-up with Cardiology (EP) scheduled to see Dr. Camacho on 5/1/24 at which point further workup and need for ICD can be discussed - Continue Metoprolol 25mg succinate - Continue Lasix 20mg daily with close monitoring of daily weights - Continue [facility] in 6-8 weeks for follow-up with sleep study completed.  Miesha Awan MD, MSCR

## 2024-04-25 NOTE — PHYSICAL EXAM
[No Acute Distress] : no acute distress [Well Nourished] : well nourished [Well Developed] : well developed [Normal Oropharynx] : normal oropharynx [Normal Appearance] : normal appearance [Supple] : supple [No Neck Mass] : no neck mass [No JVD] : no jvd [Normal Rate/Rhythm] : normal rate/rhythm [Normal S1, S2] : normal s1, s2 [No Murmurs] : no murmurs [No Resp Distress] : no resp distress [No Acc Muscle Use] : no acc muscle use [Normal Rhythm and Effort] : normal rhythm and effort [Clear to Auscultation Bilaterally] : clear to auscultation bilaterally [No Abnormalities] : no abnormalities [Benign] : benign [Normal Gait] : normal gait [No Clubbing] : no clubbing [No Cyanosis] : no cyanosis [No Edema] : no edema [Normal Color/ Pigmentation] : normal color/ pigmentation [No Focal Deficits] : no focal deficits [Oriented x3] : oriented x3 [Normal Insight/judgment] : normal insight/judgment [Normal Affect] : normal affect

## 2024-04-25 NOTE — REVIEW OF SYSTEMS
[Fever] : no fever [Fatigue] : no fatigue [Chills] : no chills [Poor Appetite] : no poor appetite [Recent Wt Loss (___ Lbs)] : ~T recent [unfilled] lb weight loss [Sore Throat] : no sore throat [Sinus Problems] : no sinus problems [Cough] : no cough [Chest Tightness] : no chest tightness [Sputum] : no sputum [Dyspnea] : no dyspnea [Pleuritic Pain] : no pleuritic pain [Wheezing] : no wheezing [SOB on Exertion] : sob on exertion [Chest Discomfort] : no chest discomfort [Edema] : no edema [Palpitations] : no palpitations [Seasonal Allergies] : no seasonal allergies [GERD] : no gerd [Abdominal Pain] : no abdominal pain [Nausea] : no nausea [Vomiting] : no vomiting [Dysuria] : no urgency [Arthralgias] : no arthralgias [Myalgias] : no myalgias [Rash] : no rash [Anemia] : no anemia [Easy Bruising] : no easy bruising [Clotting Disorder/ Frequent bleeding] : no clotting disorder/ frequent bleeding [Headache] : no headache [Focal Weakness] : no focal weakness [Dizziness] : no dizziness [Numbness] : no numbness [Depression] : no depression [Anxiety] : no anxiety [Panic Attacks] : no panic attacks [Diabetes] : no diabetes [Thyroid Problem] : no thyroid problem [Obesity] : obesity

## 2024-04-25 NOTE — REASON FOR VISIT
[Follow-Up - From Hospitalization] : a follow-up visit after a recent hospitalization [Sleep Apnea] : sleep apnea [Obesity] : obesity [TextBox_44] : Heart Failure

## 2024-04-25 NOTE — HISTORY OF PRESENT ILLNESS
[Former] : former [TextBox_4] : Mr. Rodriguez is a 49 yo M with no significant past medical history until presented to ED on 4/15/24 after an episode of shortness of breath. Stated that 2 days prior to hospitalization he was not able to stop coughing and became extremely short of breath for about 50 seconds. This episode was not brought about with exertion, and the last time this occurred was about 1 year ago or so. States that he quit smoking a year ago ( after smoking 1 PPD x 5 years) and since then has been having worsening cough, sometimes with sputum. States that he has been getting more short of breath the past year as well, and now is having trouble going up one flight of stairs and feels winded by the time he gets up there. He states since quitting smoking, he has been working to lose weight and has lost about 30lbs in the past 6 months ( has been eating healthier and working out). He denies any swelling in his lower extremities, any PND, but does endorse episodes of choking in the middle of the night that is witnessed by his wife. States that it has happened a few times the past year.  Last saw PCP ~ 1 year ago for dizziness/lightheadedness, feeling like he was going to faint, was referred to Cardiology but never went. Denies taking any supplements for weight loss.  No recent viral illnesses. CXR showed cardiomegaly and pulmonary vascular congestion, and TTE shows EF 15-20%, Grade 1 diastolic dysfunction w/ eccentric hypertrophy. Underwent cardiac cath showing no e/o obstructive CAD but mildly elevated LVEDP of 19. He was diuresed with significant improvement in his symptoms and discharged on Metoprolol 25mg succ, Farxiga and Lasix 20mg daily.  Since going home patient reports breathing improved.  Denies palpitations. Sleeping better with less snoring.  Social History: Former smoker (5pkyrs, quit 1 yr ago), Social ETOH use, THC use in his 20s but denies heroin/cocaine or any other elicit drug use. Has 4 children (ages 24, 23, 20, 12) with the youngest most active athletically.  Family History: HTN (Mother), not familiar with Father's history   [TextBox_11] : 1 [TextBox_13] : 5 [YearQuit] : 2023

## 2024-05-01 ENCOUNTER — APPOINTMENT (OUTPATIENT)
Dept: ELECTROPHYSIOLOGY | Facility: CLINIC | Age: 49
End: 2024-05-01

## 2024-05-01 ENCOUNTER — APPOINTMENT (OUTPATIENT)
Dept: CARDIOLOGY | Facility: CLINIC | Age: 49
End: 2024-05-01
Payer: COMMERCIAL

## 2024-05-01 ENCOUNTER — NON-APPOINTMENT (OUTPATIENT)
Age: 49
End: 2024-05-01

## 2024-05-01 VITALS
DIASTOLIC BLOOD PRESSURE: 105 MMHG | OXYGEN SATURATION: 95 % | SYSTOLIC BLOOD PRESSURE: 158 MMHG | HEART RATE: 77 BPM | BODY MASS INDEX: 34.27 KG/M2 | WEIGHT: 253 LBS | HEIGHT: 72 IN

## 2024-05-01 PROCEDURE — G2211 COMPLEX E/M VISIT ADD ON: CPT

## 2024-05-01 PROCEDURE — 93000 ELECTROCARDIOGRAM COMPLETE: CPT

## 2024-05-01 PROCEDURE — 99214 OFFICE O/P EST MOD 30 MIN: CPT

## 2024-05-01 RX ORDER — FUROSEMIDE 20 MG/1
20 TABLET ORAL
Qty: 90 | Refills: 3 | Status: ACTIVE | COMMUNITY
Start: 2024-05-01 | End: 1900-01-01

## 2024-05-01 RX ORDER — SACUBITRIL AND VALSARTAN 24; 26 MG/1; MG/1
24-26 TABLET, FILM COATED ORAL TWICE DAILY
Qty: 180 | Refills: 3 | Status: ACTIVE | COMMUNITY
Start: 2024-05-01 | End: 1900-01-01

## 2024-05-01 RX ORDER — DAPAGLIFLOZIN 10 MG/1
10 TABLET, FILM COATED ORAL
Qty: 90 | Refills: 3 | Status: ACTIVE | COMMUNITY
Start: 2024-05-01 | End: 1900-01-01

## 2024-05-01 NOTE — ASSESSMENT
[FreeTextEntry1] : 1. Start Entresto 24/26 mg BID 2. 2L fluid restriction and 2.5 gm salt restriction 3. Needs sleep study 4. Daily weight with increased diuretic dose if 2 lb increase f/u 1 month for labs and Entresto titration. Will defer MRA for now as BP is borderline.

## 2024-05-01 NOTE — REASON FOR VISIT
[Symptom and Test Evaluation] : symptom and test evaluation [Cardiac Failure] : cardiac failure [FreeTextEntry1] : Pt known to me from hospital. Very pleasant 48 year old gentleman admitted with dyspnea, found to have EF 20%, cath with no CAD and EDP 19. D/c'd home on Metoprolol, Lasix and Farxiga Returns for f/u. Still dyspneic on exertion, less so at night during sleep. No CP. Lasix 20 mg daily PRN based on weight. Works in construction.  PMH Possible STACY SH: Occasional alcohol FH: HTN, no CAD  1. Start Entresto 24/26 mg BID 2. 2L fluid restriction and 2.5 gm salt restriction 3. Needs sleep study 4. Daily weight with increased diuretic dose if 2 lb increase f/u 1 month for labs and Entresto titration. Will defer MRA for now as BP is borderline.

## 2024-05-10 ENCOUNTER — NON-APPOINTMENT (OUTPATIENT)
Age: 49
End: 2024-05-10

## 2024-05-10 ENCOUNTER — APPOINTMENT (OUTPATIENT)
Dept: CARDIOLOGY | Facility: CLINIC | Age: 49
End: 2024-05-10
Payer: COMMERCIAL

## 2024-05-10 VITALS
OXYGEN SATURATION: 94 % | HEIGHT: 72 IN | BODY MASS INDEX: 34.27 KG/M2 | DIASTOLIC BLOOD PRESSURE: 73 MMHG | WEIGHT: 253 LBS | SYSTOLIC BLOOD PRESSURE: 108 MMHG | HEART RATE: 83 BPM

## 2024-05-10 PROCEDURE — 93000 ELECTROCARDIOGRAM COMPLETE: CPT | Mod: LT

## 2024-05-10 PROCEDURE — 99215 OFFICE O/P EST HI 40 MIN: CPT

## 2024-05-10 PROCEDURE — 93246 EXT ECG>7D<15D RECORDING: CPT

## 2024-05-10 PROCEDURE — G2211 COMPLEX E/M VISIT ADD ON: CPT | Mod: NC,1L

## 2024-05-10 RX ORDER — APIXABAN 5 MG/1
5 TABLET, FILM COATED ORAL
Qty: 180 | Refills: 3 | Status: ACTIVE | COMMUNITY
Start: 1900-01-01 | End: 1900-01-01

## 2024-05-10 NOTE — ASSESSMENT
[FreeTextEntry1] : 1.CSHF. No signs of volume overload. Given sx with low BP and acute CVA, will hold of on Entresto for now to avoid lowering perfusion pressures. May need to stop Farxiga temporarily as well. Will refer to HF team for help in managing this tenuous patient.  Continue low dose lasix and metoprolol for now. 2. CVA. At high risk for afib given CMP. Already on Eliquis. Will place Zio patch for 2 weeks. Likely will need ILR. 3. Once 4-6 weeks have passed, will schedule JOSE ALBERTO for CSOE.  4. Restricted from work until at least 7/1/24 5. HLD. Continue atorvastatin. Check labs next visit.  f/u 1m  I spent 45 minutes on Mr. Tellez's care, including face to face time, review of his inpatient hospital records from [place], and documentation.

## 2024-05-10 NOTE — REASON FOR VISIT
[Symptom and Test Evaluation] : symptom and test evaluation [Cardiac Failure] : cardiac failure [Arrhythmia/ECG Abnorrmalities] : arrhythmia/ECG abnormalities [FreeTextEntry1] : Pt was admitted to [facility] 5/5/24- 5/7/24 with suspected CVA.  I reviewed his inpatient testing. Initial CT scan negative, MRI with L parietal lobe CVA that the neurologist suspected was 3 separate lesions. No vascular disease on MRA. Right arm weakness and word finding difficulties improved after 24 hours. No residual. Treated empirically with Lipitor and Eliquis for presumed afib.  Pt continues to have lightheadedness and some cognitive disturbance when BP <100. They are keeping a careful home BP log.   1.CSHF. No signs of volume overload. Given sx with low BP and acute CVA, will hold of on Entresto for now to avoid lowering perfusion pressures. May need to stop Farxiga temporarily as well. Will refer to HF team for help in managing this tenuous patient.  Continue low dose lasix and metoprolol for now. 2. CVA. At high risk for afib given CMP. Already on Eliquis. Will place Zio patch for 2 weeks. Likely will need ILR. 3. Once 4-6 weeks have passed, will schedule JOSE ALBERTO for CSOE.  4. Restricted from work until at least 7/1/24 5. HLD. Continue atorvastatin. Check labs next visit.  f/u 1m

## 2024-05-12 NOTE — ED ADULT TRIAGE NOTE - AS TEMP SITE
Accepts [place] Medicaid, Insurance, Self-Pay       [facility]  [address] [place] [address]  [phone]      Monday-Thursday 7:30am-3:30pm; Friday 7:30am-12:30pm      Appointment Only      No residency requirements; covers patients up to age 18      Accepts Medicaid, Insurance, Self-Pay    [facility]  [place]  [address], [address]  [phone]      Monday through Thursday 8:00am-5:00pm, Friday 8:00am - 2:00pm  Must be a resident of [facility]      Sliding Fee Scale  *Scale requires proof of income (example: pay stub or tax return). Scale is based on family size and income. Discounts can be 25%, 50%, 75%, or 100% of all services.   Accepts [place] Medicaid, Insurance, Self-Pay    [facility]  [place]   [address] [place] [address] [place] [address] [phone]  Monday through Friday 8:00am-5:00pm  Must be a resident of [facility]      Sliding Fee Scale  *Scale requires proof of income (example: pay stub or tax return). Scale is based on family size and income. Discounts can be 25%, 50%, 75%, or 100% of all services.   Accepts [place] Medicaid, Insurance, Self-Pay    [facility]  [place]  [address], [place], [address] [phone]      Monday through Friday 8:00am-5:00pm  Must be a resident of [facility]      Sliding Fee Scale  *Scale requires proof of income (example: pay stub or tax return). Scale is based on family size and income. Discounts can be 25%, 50%, 75%, or 100% of all services.   Accepts [place] Medicaid, Insurance, Self-Pay      [facility]  [address], [place] [address] [phone] option 2      Monday-Friday 8am-345pm  Appointment Only      Will do emergency patients, but you must call first to get an appointment.  No residency requirements  Sees only children up to 
oral

## 2024-06-25 ENCOUNTER — NON-APPOINTMENT (OUTPATIENT)
Age: 49
End: 2024-06-25

## 2024-06-25 ENCOUNTER — APPOINTMENT (OUTPATIENT)
Dept: HEART FAILURE | Facility: CLINIC | Age: 49
End: 2024-06-25
Payer: COMMERCIAL

## 2024-06-25 VITALS
OXYGEN SATURATION: 95 % | BODY MASS INDEX: 34.27 KG/M2 | HEART RATE: 68 BPM | WEIGHT: 253 LBS | DIASTOLIC BLOOD PRESSURE: 66 MMHG | HEIGHT: 72 IN | SYSTOLIC BLOOD PRESSURE: 110 MMHG

## 2024-06-25 DIAGNOSIS — R29.818 OTHER SYMPTOMS AND SIGNS INVOLVING THE NERVOUS SYSTEM: ICD-10-CM

## 2024-06-25 DIAGNOSIS — E66.9 OBESITY, UNSPECIFIED: ICD-10-CM

## 2024-06-25 PROCEDURE — 93000 ELECTROCARDIOGRAM COMPLETE: CPT

## 2024-06-25 PROCEDURE — 99214 OFFICE O/P EST MOD 30 MIN: CPT | Mod: 25

## 2024-06-25 PROCEDURE — 99204 OFFICE O/P NEW MOD 45 MIN: CPT | Mod: 25

## 2024-06-25 RX ORDER — METOPROLOL SUCCINATE 25 MG/1
25 TABLET, EXTENDED RELEASE ORAL TWICE DAILY
Qty: 180 | Refills: 3 | Status: ACTIVE | COMMUNITY
Start: 2024-05-01 | End: 1900-01-01

## 2024-07-01 ENCOUNTER — NON-APPOINTMENT (OUTPATIENT)
Age: 49
End: 2024-07-01

## 2024-07-01 ENCOUNTER — APPOINTMENT (OUTPATIENT)
Dept: CARDIOLOGY | Facility: CLINIC | Age: 49
End: 2024-07-01
Payer: COMMERCIAL

## 2024-07-01 VITALS
HEIGHT: 72 IN | SYSTOLIC BLOOD PRESSURE: 87 MMHG | DIASTOLIC BLOOD PRESSURE: 58 MMHG | WEIGHT: 253 LBS | BODY MASS INDEX: 34.27 KG/M2 | OXYGEN SATURATION: 96 % | HEART RATE: 68 BPM

## 2024-07-01 DIAGNOSIS — I63.9 CEREBRAL INFARCTION, UNSPECIFIED: ICD-10-CM

## 2024-07-01 DIAGNOSIS — I50.22 CHRONIC SYSTOLIC (CONGESTIVE) HEART FAILURE: ICD-10-CM

## 2024-07-01 PROCEDURE — 93000 ELECTROCARDIOGRAM COMPLETE: CPT

## 2024-07-01 PROCEDURE — G2211 COMPLEX E/M VISIT ADD ON: CPT | Mod: NC

## 2024-07-01 PROCEDURE — 99214 OFFICE O/P EST MOD 30 MIN: CPT | Mod: 25

## 2024-07-10 ENCOUNTER — APPOINTMENT (OUTPATIENT)
Dept: ELECTROPHYSIOLOGY | Facility: CLINIC | Age: 49
End: 2024-07-10

## 2024-07-10 ENCOUNTER — APPOINTMENT (OUTPATIENT)
Dept: SLEEP CENTER | Facility: CLINIC | Age: 49
End: 2024-07-10

## 2024-07-25 ENCOUNTER — APPOINTMENT (OUTPATIENT)
Age: 49
End: 2024-07-25

## 2024-09-16 ENCOUNTER — APPOINTMENT (OUTPATIENT)
Dept: HEART FAILURE | Facility: CLINIC | Age: 49
End: 2024-09-16

## 2024-09-19 ENCOUNTER — APPOINTMENT (OUTPATIENT)
Dept: CARDIOLOGY | Facility: CLINIC | Age: 49
End: 2024-09-19

## 2024-10-27 ENCOUNTER — EMERGENCY (EMERGENCY)
Facility: HOSPITAL | Age: 49
LOS: 1 days | Discharge: HOME | End: 2024-10-27
Admitting: EMERGENCY MEDICINE
Payer: COMMERCIAL

## 2024-10-27 VITALS
WEIGHT: 240.08 LBS | DIASTOLIC BLOOD PRESSURE: 67 MMHG | SYSTOLIC BLOOD PRESSURE: 114 MMHG | HEART RATE: 79 BPM | TEMPERATURE: 97 F | RESPIRATION RATE: 18 BRPM | OXYGEN SATURATION: 100 %

## 2024-10-27 PROCEDURE — 99284 EMERGENCY DEPT VISIT MOD MDM: CPT

## 2024-10-27 RX ORDER — LIDOCAINE 50 MG/G
15 CREAM TOPICAL ONCE
Refills: 0 | Status: COMPLETED | OUTPATIENT
Start: 2024-10-27 | End: 2024-10-27

## 2024-10-27 RX ADMIN — LIDOCAINE 15 MILLILITER(S): 50 CREAM TOPICAL at 23:28

## 2024-10-27 NOTE — ED ADULT NURSE NOTE - OBJECTIVE STATEMENT
Pt received in intake 10c. Pt alert and oriented x 4, ambulatory at baseline. Hx of CVA, CHF. Pt c/o right upper tooth pain that began 2 days ago. Pt speaking in complete sentences, able to maintain oral secretions. Respirations even and unlabored, NAD. Pt medicated per MD orders.

## 2024-10-27 NOTE — ED PROVIDER NOTE - CONSTITUTIONAL, MLM
normal... Well appearing, awake, alert, oriented to person, place, time/situation and in no apparent distress. No facial swelling

## 2024-10-27 NOTE — ED PROVIDER NOTE - NSFOLLOWUPINSTRUCTIONS_ED_ALL_ED_FT
Dental Pain: What It Means  A close-up view of two teeth. One tooth is normal, and the other has tooth decay and an abscess.  Dental pain is often a sign that something is wrong with your teeth or gums. You can also have pain after a dental treatment. If you have dental pain, it's important to contact your dental care provider, especially if you don't know what's causing the pain.    Dental pain may hurt a lot or a little. It can be caused by many things, including:  Tooth decay. This is also called cavities or caries.  Infection.  An abscess. This is when the inner part of the tooth is filled with pus.  An injury or a crack in the tooth.  Gum disease or gums that move back and expose the root of a tooth.  Abnormal grinding or clenching of teeth.  Not taking good care of your teeth.  Sometimes the cause of pain isn't known.    You may have pain all the time, or it may come and go. It may happen only when you're:  Chewing.  Exposed to hot or cold temperatures.  Eating or drinking foods or drinks with a lot of sugar in them, such as soda or candy.  Follow these instructions at home:  Medicines    Take your medicines only as told.  If you were given antibiotics, take them as told. Do not stop taking them even if you start to feel better.  Eating and drinking    Avoid foods or drinks that cause you pain. These may include:  Very hot or very cold foods or drinks.  Sweet or sugary foods or drinks.  Managing pain and swelling    Bag of ice on a towel on the skin.  Use ice or an ice pack on the painful area of your face as told.  Place a towel between your skin and the ice.  Leave the ice on for 20 minutes, 2–3 times a day.  If your skin turns red, take off the ice right away to prevent skin damage. The risk of damage is higher if you can't feel pain, heat, or cold.  Brushing and flossing    Brush your teeth twice a day using a fluoride toothpaste.  Use a toothpaste made for sensitive teeth as told by your dental care provider.  Always use a soft toothbrush. This will help prevent irritation of your gums.  Floss your teeth at least once a day.  General instructions    Do not apply heat to the outside of your face.  To cleanse your mouth and help with any irritation and swelling in the painful area, you can try rinsing with salt water.  Swish and gargle with salt water and then spit it out. To make salt water, add a half to a whole spoonful of salt to a glass of warm water. Mix well.  You can repeat this every 2–3 hours for pain.  Contact a dental care provider if:  You have dental pain and you don't know why.  Your pain isn't controlled with medicines.  Your symptoms get worse.  You have new symptoms.  Get help right away if:  You can't open your mouth.  You're having trouble breathing or swallowing.  You have a fever.  Your face, neck, or jaw is swollen.  These symptoms may be an emergency. Call 911 right away.  Do not wait to see if the symptoms will go away.  Do not drive yourself to the hospital.  This information is not intended to replace advice given to you by your health care provider. Make sure you discuss any questions you have with your health care provider.

## 2024-10-27 NOTE — ED PROVIDER NOTE - NSPTACCESSSVCSAPPTDETAILS_ED_ALL_ED_FT
right upper molar tooth pain, was toold need to be covered/capped serval months ago but has not been able to get an appointment. right upper molar tooth pain, was told need to be covered/capped serval months ago but has not been able to get an appointment.

## 2024-10-27 NOTE — ED PROVIDER NOTE - TOOTH LATERALITY
multiple dental carries but no gum swelling/bleeding or discharge noted. ttp to the right upper molar.

## 2024-10-27 NOTE — ED PROVIDER NOTE - PATIENT PORTAL LINK FT
You can access the FollowMyHealth Patient Portal offered by [facility] by registering at the following website: [email or web address]. By joining [facility]’s FollowMyHealth portal, you will also be able to view your health information using other applications (apps) compatible with our system.

## 2024-10-27 NOTE — ED ADULT NURSE NOTE - AS PAIN REST
Principal Discharge DX:	Ectopic pregnancy, unspecified location, unspecified whether intrauterine pregnancy present
8 (severe pain)

## 2024-10-27 NOTE — ED PROVIDER NOTE - OBJECTIVE STATEMENT
48-year-old male with past medical history of stroke on Eliquis, recently diagnosed heart failure on 4/24 on Lasix, metoprolol presenting with worsening right upper molar tooth pain for the past 1 week.  Patient states he has been taking acetaminophen 1000 mg for pain with no relief, states he notes he is not able to take NSAIDs as he is taking Eliquis.  States he saw his dentist several months ago prior to this recent diagnosis of heart failure and stroke and was told that his tooth needed to be shaved down and kept but was not able to reschedule the appointment again.  Able to tolerate p.o. intake however has sensitivities to hard food and to temperature changes of food.    Denies fevers, chills, facial swelling, drooling, shortness of breath, discharge or bleeding from the gums, neck pain, sore throat, muffled voice, chest pain, shortness of breath, palpitations, abd pain, n/v/d.

## 2024-10-27 NOTE — ED PROVIDER NOTE - CLINICAL SUMMARY MEDICAL DECISION MAKING FREE TEXT BOX
48-year-old male with past medical history of stroke on Eliquis, recently diagnosed heart failure on 4/24 on Lasix, metoprolol presenting with worsening right upper molar tooth pain for the past 1 week.  Patient states he has been taking acetaminophen 1000 mg for pain with no relief, states he notes he is not able to take NSAIDs as he is taking Eliquis.  States he saw his dentist several months ago prior to this recent diagnosis of heart failure and stroke and was told that his tooth needed to be shaved down and kept but was not able to reschedule the appointment again.  Able to tolerate p.o. intake however has sensitivities to hard food and to temperature changes of food.    Patient not immunosuppressed.  No e/o tooth fracture, avulsion, or bleeding socket.  No e/o RPA, PTA, Dante’s angina, periapical abscess.  No e/o gingival hyperplasia or concern for drug reaction.    Defer ABX for dental pain alone with no evidence of infection.  Disposition: pain control and Discharge home. Discussed return precautions for odontogenic infections and other dental pain emergencies. Will provide dental clinic list. 48-year-old male with past medical history of stroke on Eliquis, recently diagnosed heart failure on 4/24 on Lasix, metoprolol presenting with worsening right upper molar tooth pain for the past 1 week.  Patient states he has been taking acetaminophen 1000 mg for pain with no relief, states he notes he is not able to take NSAIDs as he is taking Eliquis.  States he saw his dentist several months ago prior to this recent diagnosis of heart failure and stroke and was told that his tooth needed to be shaved down and kept but was not able to reschedule the appointment again.  Able to tolerate p.o. intake however has sensitivities to hard food and to temperature changes of food.    Patient not immunosuppressed.  No e/o tooth fracture, avulsion, or bleeding socket.  No e/o RPA, PTA, Dante’s angina, periapical abscess.  No e/o gingival hyperplasia or concern for drug reaction.    Will start on amox in case dental nees to do procedure outpt but no evidence of infxn on exam  Disposition: pain control and Discharge home. Discussed return precautions for odontogenic infections and other dental pain emergencies. Will provide dental clinic list.

## 2024-10-28 RX ORDER — OXYCODONE HYDROCHLORIDE 30 MG/1
5 TABLET, FILM COATED, EXTENDED RELEASE ORAL ONCE
Refills: 0 | Status: DISCONTINUED | OUTPATIENT
Start: 2024-10-28 | End: 2024-10-28

## 2024-10-28 RX ORDER — AMOXICILLIN 250 MG/5ML
1 SUSPENSION, RECONSTITUTED, ORAL (ML) ORAL
Qty: 14 | Refills: 0
Start: 2024-10-28 | End: 2024-11-03

## 2024-10-28 RX ORDER — OXYCODONE HYDROCHLORIDE 30 MG/1
1 TABLET, FILM COATED, EXTENDED RELEASE ORAL
Qty: 4 | Refills: 0
Start: 2024-10-28 | End: 2024-10-28

## 2024-10-28 RX ADMIN — OXYCODONE HYDROCHLORIDE 5 MILLIGRAM(S): 30 TABLET, FILM COATED, EXTENDED RELEASE ORAL at 00:09

## 2024-10-28 NOTE — ED POST DISCHARGE NOTE - RESULT SUMMARY
CDU RAVINDER Ray: RAVINDER Keyes advising to send oxycodone for pain control for dental pain ED visit today, RAVINDER Keyes unable to send due to IT issue. [facility]. RAVINDER Ray: RAVINDER Keyes advising to send oxycodone for pain control for dental pain ED visit today, RAVINDER Keyes unable to send due to IT issue. will [facility] to [facility] 4 tablets.

## 2024-12-10 NOTE — ED ADULT TRIAGE NOTE - SPO2 (%)
Quality 226: Preventive Care And Screening: Tobacco Use: Screening And Cessation Intervention: Patient screened for tobacco use, is a smoker AND did not receive Cessation Counseling during measurement period or in the six months prior to the measurement period 96 Detail Level: Detailed Quality 431: Preventive Care And Screening: Unhealthy Alcohol Use - Screening: Patient not screened for unhealthy alcohol use using a systematic screening method Quality 130: Documentation Of Current Medications In The Medical Record: Current Medications Documented

## 2025-01-14 ENCOUNTER — NON-APPOINTMENT (OUTPATIENT)
Age: 50
End: 2025-01-14

## 2025-02-05 PROBLEM — I50.9 HEART FAILURE, UNSPECIFIED: Chronic | Status: ACTIVE | Noted: 2024-10-27

## 2025-02-05 PROBLEM — Z86.73 PERSONAL HISTORY OF TRANSIENT ISCHEMIC ATTACK (TIA), AND CEREBRAL INFARCTION WITHOUT RESIDUAL DEFICITS: Chronic | Status: ACTIVE | Noted: 2024-10-27

## 2025-02-14 ENCOUNTER — EMERGENCY (EMERGENCY)
Facility: HOSPITAL | Age: 50
LOS: 0 days | Discharge: HOME | End: 2025-02-14
Attending: STUDENT IN AN ORGANIZED HEALTH CARE EDUCATION/TRAINING PROGRAM
Payer: COMMERCIAL

## 2025-02-14 VITALS
OXYGEN SATURATION: 99 % | DIASTOLIC BLOOD PRESSURE: 64 MMHG | HEART RATE: 56 BPM | RESPIRATION RATE: 18 BRPM | SYSTOLIC BLOOD PRESSURE: 110 MMHG

## 2025-02-14 VITALS
OXYGEN SATURATION: 98 % | SYSTOLIC BLOOD PRESSURE: 130 MMHG | HEIGHT: 72 IN | DIASTOLIC BLOOD PRESSURE: 78 MMHG | TEMPERATURE: 97 F | HEART RATE: 58 BPM | WEIGHT: 266.98 LBS | RESPIRATION RATE: 20 BRPM

## 2025-02-14 DIAGNOSIS — Z76.0 ENCOUNTER FOR ISSUE OF REPEAT PRESCRIPTION: ICD-10-CM

## 2025-02-14 DIAGNOSIS — I50.9 HEART FAILURE, UNSPECIFIED: ICD-10-CM

## 2025-02-14 DIAGNOSIS — R07.81 PLEURODYNIA: ICD-10-CM

## 2025-02-14 PROCEDURE — 99284 EMERGENCY DEPT VISIT MOD MDM: CPT

## 2025-02-14 RX ORDER — SACUBITRIL AND VALSARTAN 49; 51 MG/1; MG/1
1 TABLET, FILM COATED ORAL ONCE
Refills: 0 | Status: COMPLETED | OUTPATIENT
Start: 2025-02-14 | End: 2025-02-14

## 2025-02-14 RX ORDER — CARVEDILOL 6.25 MG
12.5 TABLET ORAL ONCE
Refills: 0 | Status: DISCONTINUED | OUTPATIENT
Start: 2025-02-14 | End: 2025-02-14

## 2025-02-14 RX ORDER — ACETAMINOPHEN 160 MG/5ML
975 SUSPENSION ORAL ONCE
Refills: 0 | Status: COMPLETED | OUTPATIENT
Start: 2025-02-14 | End: 2025-02-14

## 2025-02-14 RX ORDER — RIVAROXABAN 20 MG/1
10 TABLET, FILM COATED ORAL ONCE
Refills: 0 | Status: COMPLETED | OUTPATIENT
Start: 2025-02-14 | End: 2025-02-14

## 2025-02-14 RX ORDER — DAPAGLIFLOZIN 5 MG/1
10 TABLET, FILM COATED ORAL ONCE
Refills: 0 | Status: COMPLETED | OUTPATIENT
Start: 2025-02-14 | End: 2025-02-14

## 2025-02-14 RX ADMIN — SACUBITRIL AND VALSARTAN 1 TABLET(S): 49; 51 TABLET, FILM COATED ORAL at 17:47

## 2025-02-14 RX ADMIN — DAPAGLIFLOZIN 10 MILLIGRAM(S): 5 TABLET, FILM COATED ORAL at 17:14

## 2025-02-14 RX ADMIN — RIVAROXABAN 10 MILLIGRAM(S): 20 TABLET, FILM COATED ORAL at 17:15

## 2025-02-14 RX ADMIN — ACETAMINOPHEN 975 MILLIGRAM(S): 160 SUSPENSION ORAL at 17:14
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
## 2025-02-14 NOTE — ED ADULT NURSE NOTE - OBJECTIVE STATEMENT
04-Oct-2018 19:45 50 yo male, A&Ox4, presents to ED accompanied by police escort, reports needs Entresto 97mg po 2x/day ELIQUIS 2.5 po daily cardilol 25mg po 2x/day  verquavo PO daily Farxiga Daily Eplerenone three times a week mon wed fri  ASA 81mg PO three times a week  Lasix PO PRN   h/o CHF  CVA handcuffed with police escort

## 2025-02-14 NOTE — ED PROVIDER NOTE - NSFOLLOWUPINSTRUCTIONS_ED_ALL_ED_FT
You were seen today for medication refill - we gave you your Xarelto, Entresto and Farxiga   Unfortunately we could not dose your epleronone or Verquavo as hospital did not have it   Please Continue your home medications  We did not give your carvedilol today because heart rate was low     Follow up with cardiologist as soon as possible to make sure your medications are at the appropriate dose    Return for any worsening symptoms
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
## 2025-02-14 NOTE — ED PROVIDER NOTE - PHYSICAL EXAMINATION
Gen: aox,3 nad,   Head: NCAT  ENT: Airway patent, moist mucous membranes, nasal passageways clear   Cardiac: Normal rate, normal rhythm   Respiratory: Lungs CTA B/L  Gastrointestinal: Abdomen soft, nontender, nondistended, no rebound, no guarding  MSK: No gross abnormalities, FROM of all four extremities, no edema, no midline spinal ttp, no chest wall ttp   HEME: Extremities warm, pulses intact and symmetrical in all four extremities  Skin: No rashes, no lesions, no ecchymosis   Neuro: No gross neurologic deficits

## 2025-02-14 NOTE — ED PROVIDER NOTE - NS_EDPROVIDERDISPOUSERTYPE_ED_A_ED
Attending Attestation (For Attendings USE Only)... Terbinafine Counseling: Patient counseling regarding adverse effects of terbinafine including but not limited to headache, diarrhea, rash, upset stomach, liver function test abnormalities, itching, taste/smell disturbance, nausea, abdominal pain, and flatulence.  There is a rare possibility of liver failure that can occur when taking terbinafine.  The patient understands that a baseline LFT and kidney function test may be required. The patient verbalized understanding of the proper use and possible adverse effects of terbinafine.  All of the patient's questions and concerns were addressed.

## 2025-02-14 NOTE — ED ADULT NURSE NOTE - NSFALLHARMRISKINTERV_ED_ALL_ED

## 2025-02-26 ENCOUNTER — NON-APPOINTMENT (OUTPATIENT)
Age: 50
End: 2025-02-26

## 2025-02-26 ENCOUNTER — APPOINTMENT (OUTPATIENT)
Dept: CARDIOLOGY | Facility: CLINIC | Age: 50
End: 2025-02-26
Payer: COMMERCIAL

## 2025-02-26 VITALS
OXYGEN SATURATION: 93 % | SYSTOLIC BLOOD PRESSURE: 101 MMHG | DIASTOLIC BLOOD PRESSURE: 65 MMHG | HEIGHT: 72 IN | WEIGHT: 253 LBS | HEART RATE: 73 BPM | BODY MASS INDEX: 34.27 KG/M2

## 2025-02-26 DIAGNOSIS — I50.22 CHRONIC SYSTOLIC (CONGESTIVE) HEART FAILURE: ICD-10-CM

## 2025-02-26 PROCEDURE — G2211 COMPLEX E/M VISIT ADD ON: CPT | Mod: NC

## 2025-02-26 PROCEDURE — 93000 ELECTROCARDIOGRAM COMPLETE: CPT

## 2025-02-26 PROCEDURE — 99214 OFFICE O/P EST MOD 30 MIN: CPT

## 2025-02-26 RX ORDER — EPLERENONE 25 MG/1
25 TABLET, FILM COATED ORAL DAILY
Refills: 0 | Status: ACTIVE | COMMUNITY
Start: 2025-02-26

## 2025-02-26 RX ORDER — VERICIGUAT 2.5 MG/1
2.5 TABLET, FILM COATED ORAL DAILY
Qty: 90 | Refills: 3 | Status: ACTIVE | COMMUNITY
Start: 2025-02-26 | End: 1900-01-01

## 2025-02-26 RX ORDER — CARVEDILOL 12.5 MG/1
12.5 TABLET, FILM COATED ORAL TWICE DAILY
Qty: 180 | Refills: 3 | Status: ACTIVE | COMMUNITY
Start: 2025-02-26 | End: 1900-01-01

## 2025-04-02 ENCOUNTER — APPOINTMENT (OUTPATIENT)
Dept: CARDIOLOGY | Facility: CLINIC | Age: 50
End: 2025-04-02
Payer: COMMERCIAL

## 2025-04-02 ENCOUNTER — NON-APPOINTMENT (OUTPATIENT)
Age: 50
End: 2025-04-02

## 2025-04-02 VITALS
DIASTOLIC BLOOD PRESSURE: 63 MMHG | WEIGHT: 251 LBS | HEIGHT: 72 IN | BODY MASS INDEX: 34 KG/M2 | TEMPERATURE: 98.2 F | SYSTOLIC BLOOD PRESSURE: 104 MMHG | HEART RATE: 63 BPM | OXYGEN SATURATION: 94 %

## 2025-04-02 DIAGNOSIS — I50.22 CHRONIC SYSTOLIC (CONGESTIVE) HEART FAILURE: ICD-10-CM

## 2025-04-02 PROCEDURE — 99214 OFFICE O/P EST MOD 30 MIN: CPT

## 2025-04-02 PROCEDURE — 93000 ELECTROCARDIOGRAM COMPLETE: CPT

## 2025-04-02 PROCEDURE — G2211 COMPLEX E/M VISIT ADD ON: CPT | Mod: NC

## 2025-05-08 ENCOUNTER — NON-APPOINTMENT (OUTPATIENT)
Age: 50
End: 2025-05-08